# Patient Record
Sex: FEMALE | Race: WHITE | NOT HISPANIC OR LATINO | Employment: OTHER | URBAN - METROPOLITAN AREA
[De-identification: names, ages, dates, MRNs, and addresses within clinical notes are randomized per-mention and may not be internally consistent; named-entity substitution may affect disease eponyms.]

---

## 2023-10-26 PROBLEM — H93.13 BILATERAL TINNITUS: Status: ACTIVE | Noted: 2023-10-26

## 2023-10-26 PROBLEM — H90.3 BILATERAL SENSORINEURAL HEARING LOSS: Status: ACTIVE | Noted: 2023-10-26

## 2023-10-26 RX ORDER — PNV NO.95/FERROUS FUM/FOLIC AC 28MG-0.8MG
TABLET ORAL
COMMUNITY
Start: 2017-06-08

## 2023-10-26 RX ORDER — METHOTREXATE 2.5 MG/1
TABLET ORAL
COMMUNITY
Start: 2016-11-18 | End: 2024-03-01 | Stop reason: ALTCHOICE

## 2023-10-26 RX ORDER — TRIAMTERENE AND HYDROCHLOROTHIAZIDE 37.5; 25 MG/1; MG/1
CAPSULE ORAL
COMMUNITY
Start: 2011-01-13 | End: 2024-05-30

## 2023-10-26 RX ORDER — FAMOTIDINE 40 MG/1
TABLET, FILM COATED ORAL
COMMUNITY
Start: 2017-05-31 | End: 2024-04-17 | Stop reason: SDUPTHER

## 2023-10-26 RX ORDER — ESTRADIOL 0.1 MG/D
FILM, EXTENDED RELEASE TRANSDERMAL 2 TIMES WEEKLY
COMMUNITY
Start: 2016-11-18 | End: 2023-10-28 | Stop reason: WASHOUT

## 2023-10-26 RX ORDER — FOLIC ACID 1 MG/1
TABLET ORAL
COMMUNITY
Start: 2016-07-20 | End: 2023-10-28 | Stop reason: WASHOUT

## 2023-10-26 RX ORDER — AMLODIPINE BESYLATE 5 MG/1
TABLET ORAL
COMMUNITY
Start: 2016-07-08 | End: 2023-10-28 | Stop reason: WASHOUT

## 2023-10-26 RX ORDER — CELECOXIB 200 MG/1
CAPSULE ORAL
COMMUNITY
Start: 2016-07-20 | End: 2023-10-28 | Stop reason: WASHOUT

## 2023-10-26 RX ORDER — MAGNESIUM OXIDE/MAG AA CHELATE 300 MG
CAPSULE ORAL
COMMUNITY
End: 2023-10-28 | Stop reason: WASHOUT

## 2023-10-26 RX ORDER — ROPINIROLE 1 MG/1
TABLET, FILM COATED ORAL
COMMUNITY
Start: 2017-03-03

## 2023-10-27 ENCOUNTER — OFFICE VISIT (OUTPATIENT)
Dept: PRIMARY CARE | Facility: CLINIC | Age: 73
End: 2023-10-27
Payer: MEDICARE

## 2023-10-27 VITALS
HEIGHT: 61 IN | SYSTOLIC BLOOD PRESSURE: 128 MMHG | OXYGEN SATURATION: 98 % | WEIGHT: 218 LBS | BODY MASS INDEX: 41.16 KG/M2 | DIASTOLIC BLOOD PRESSURE: 70 MMHG | HEART RATE: 72 BPM | TEMPERATURE: 96.8 F | RESPIRATION RATE: 16 BRPM

## 2023-10-27 DIAGNOSIS — K21.9 GASTROESOPHAGEAL REFLUX DISEASE WITHOUT ESOPHAGITIS: ICD-10-CM

## 2023-10-27 DIAGNOSIS — M17.12 UNILATERAL PRIMARY OSTEOARTHRITIS, LEFT KNEE: Primary | ICD-10-CM

## 2023-10-27 DIAGNOSIS — I10 ESSENTIAL (PRIMARY) HYPERTENSION: ICD-10-CM

## 2023-10-27 PROCEDURE — 3074F SYST BP LT 130 MM HG: CPT | Performed by: INTERNAL MEDICINE

## 2023-10-27 PROCEDURE — 1159F MED LIST DOCD IN RCRD: CPT | Performed by: INTERNAL MEDICINE

## 2023-10-27 PROCEDURE — 99214 OFFICE O/P EST MOD 30 MIN: CPT | Performed by: INTERNAL MEDICINE

## 2023-10-27 PROCEDURE — 1160F RVW MEDS BY RX/DR IN RCRD: CPT | Performed by: INTERNAL MEDICINE

## 2023-10-27 PROCEDURE — 3078F DIAST BP <80 MM HG: CPT | Performed by: INTERNAL MEDICINE

## 2023-10-27 RX ORDER — FLUTICASONE PROPIONATE 110 UG/1
2 AEROSOL, METERED RESPIRATORY (INHALATION)
COMMUNITY

## 2023-10-27 RX ORDER — FLUTICASONE PROPIONATE 50 MCG
1 SPRAY, SUSPENSION (ML) NASAL 2 TIMES DAILY
COMMUNITY

## 2023-10-27 RX ORDER — MELOXICAM 15 MG/1
15 TABLET ORAL DAILY
COMMUNITY

## 2023-10-27 RX ORDER — UBIQUINOL 100 MG
CAPSULE ORAL
COMMUNITY

## 2023-10-27 NOTE — PROGRESS NOTES
"Subjective   Patient ID: Urvashi Alonso is a 72 y.o. female who presents for Surgery Clearance Left Knee .    HPI   Pt is here for pre-op clearance left knee.  Dr Greenwood.    Voices no other c/o at this time.   No cp/sob.   No prior hx of probs with anesthesia    Review of Systems   Musculoskeletal:         Left knee pain   During her pre-op testing, her blood pressure was uncharacteristically high.  Her blood pressure has been stable on amlodipine 2.5mg daily.    Her bp is normal during this visit.     Objective   /70 (BP Location: Right arm, Patient Position: Sitting, BP Cuff Size: Adult)   Pulse 72   Temp 36 °C (96.8 °F)   Resp 16   Ht 1.549 m (5' 1\")   Wt 98.9 kg (218 lb)   SpO2 98%   BMI 41.19 kg/m²     Physical Exam  Constitutional:       Appearance: Normal appearance. She is obese.   HENT:      Head: Normocephalic and atraumatic.      Right Ear: Tympanic membrane normal.      Left Ear: Tympanic membrane normal.      Nose: Nose normal.      Mouth/Throat:      Mouth: Mucous membranes are moist.      Pharynx: Oropharynx is clear.   Eyes:      Pupils: Pupils are equal, round, and reactive to light.   Cardiovascular:      Rate and Rhythm: Normal rate.      Heart sounds: Normal heart sounds. No murmur heard.  Pulmonary:      Effort: Pulmonary effort is normal.   Abdominal:      General: Abdomen is flat.      Palpations: Abdomen is soft.   Musculoskeletal:      Cervical back: Normal range of motion and neck supple.      Left knee: Swelling present. Decreased range of motion. Tenderness present.   Neurological:      General: No focal deficit present.      Mental Status: She is alert and oriented to person, place, and time.         Assessment/Plan     Problem List Items Addressed This Visit       Essential (primary) hypertension    Gastroesophageal reflux disease without esophagitis    Unilateral primary osteoarthritis, left knee - Primary      Dwp:  no meloxicam or asa. Tylenol is ok.     Mrs Alonso is " medically cleared for left knee replacement.

## 2023-10-28 PROBLEM — K21.9 GASTROESOPHAGEAL REFLUX DISEASE WITHOUT ESOPHAGITIS: Status: ACTIVE | Noted: 2023-10-28

## 2023-10-28 PROBLEM — G25.81 RESTLESS LEG SYNDROME: Status: ACTIVE | Noted: 2023-10-28

## 2023-10-28 PROBLEM — Z79.890 POSTMENOPAUSAL HORMONE REPLACEMENT THERAPY: Status: ACTIVE | Noted: 2023-10-28

## 2023-10-28 PROBLEM — N39.46 MIXED INCONTINENCE URGE AND STRESS: Status: ACTIVE | Noted: 2023-10-28

## 2023-10-28 PROBLEM — M48.062 SPINAL STENOSIS, LUMBAR REGION WITH NEUROGENIC CLAUDICATION: Status: ACTIVE | Noted: 2023-10-28

## 2023-10-28 PROBLEM — H81.10 BPPV (BENIGN PAROXYSMAL POSITIONAL VERTIGO), UNSPECIFIED LATERALITY: Status: ACTIVE | Noted: 2023-10-28

## 2023-10-28 PROBLEM — E55.9 VITAMIN D DEFICIENCY: Status: ACTIVE | Noted: 2023-10-28

## 2023-10-28 PROBLEM — M15.0 PRIMARY OSTEOARTHRITIS INVOLVING MULTIPLE JOINTS: Status: ACTIVE | Noted: 2023-10-28

## 2023-10-28 PROBLEM — N95.1 MENOPAUSAL SYNDROME (HOT FLASHES): Status: ACTIVE | Noted: 2023-10-28

## 2023-10-28 PROBLEM — M15.9 PRIMARY OSTEOARTHRITIS INVOLVING MULTIPLE JOINTS: Status: ACTIVE | Noted: 2023-10-28

## 2023-10-28 PROBLEM — M17.12 UNILATERAL PRIMARY OSTEOARTHRITIS, LEFT KNEE: Status: ACTIVE | Noted: 2023-10-28

## 2023-10-28 PROBLEM — I10 ESSENTIAL (PRIMARY) HYPERTENSION: Status: ACTIVE | Noted: 2023-10-28

## 2023-10-28 RX ORDER — AMLODIPINE BESYLATE 2.5 MG/1
2.5 TABLET ORAL DAILY
COMMUNITY
Start: 2023-08-18 | End: 2023-11-15

## 2023-10-28 RX ORDER — ESTRADIOL 0.05 MG/D
1 FILM, EXTENDED RELEASE TRANSDERMAL 2 TIMES WEEKLY
COMMUNITY
Start: 2023-06-07

## 2023-11-03 ENCOUNTER — CLINICAL SUPPORT (OUTPATIENT)
Dept: PRIMARY CARE | Facility: CLINIC | Age: 73
End: 2023-11-03
Payer: MEDICARE

## 2023-11-03 VITALS — SYSTOLIC BLOOD PRESSURE: 160 MMHG | DIASTOLIC BLOOD PRESSURE: 90 MMHG

## 2023-11-03 DIAGNOSIS — I10 ESSENTIAL (PRIMARY) HYPERTENSION: ICD-10-CM

## 2023-11-03 NOTE — PROGRESS NOTES
Pt has been checking her blood pressure at home:  Thur at 6:30 am 192/93, 7:30 am 164/101, 10:30 am 154/84, 2:30 pm 142/85.  This morning at 7 am 200/90.  She takes Dyazide 37.5-25 mg  q a and Amlodipine 2.5 q hs.  She has been having headaches in the morning.   She has knee surgery on Monday.

## 2023-11-15 ENCOUNTER — TELEPHONE (OUTPATIENT)
Dept: PRIMARY CARE | Facility: CLINIC | Age: 73
End: 2023-11-15
Payer: MEDICARE

## 2023-11-15 NOTE — TELEPHONE ENCOUNTER
[facility]  D[facility] IS CALLING HOME CARE  [phone]     ASKING IF YOU WILL FOLLOW I GAVE A VERBAL FOR YOU BUT IF YOU HAVE ANY QUESTIONS OR CONCERNS YOU CAN CALL HER. THANKS  SKILLED NURSING AND PT FROM HER GETTING HER SURGERY DONE.

## 2023-11-20 NOTE — TELEPHONE ENCOUNTER
[place] nurse with [facility] called. Pt has cloudy and strong odor of urine. Also concern of low sodium. Wants order to check sodium levels. She was recently hospitalized for low sodium.    Verbal order given.    [place]  [phone]

## 2023-11-22 ENCOUNTER — TELEPHONE (OUTPATIENT)
Dept: PRIMARY CARE | Facility: CLINIC | Age: 73
End: 2023-11-22
Payer: MEDICARE

## 2023-11-22 DIAGNOSIS — E87.1 HYPONATREMIA: Primary | ICD-10-CM

## 2023-11-22 DIAGNOSIS — R30.0 DYSURIA: Primary | ICD-10-CM

## 2023-11-22 RX ORDER — CIPROFLOXACIN 500 MG/1
500 TABLET ORAL 2 TIMES DAILY
Qty: 14 TABLET | Refills: 0 | Status: SHIPPED | OUTPATIENT
Start: 2023-11-22 | End: 2023-11-29

## 2023-12-06 ENCOUNTER — LAB (OUTPATIENT)
Dept: LAB | Facility: LAB | Age: 73
End: 2023-12-06
Payer: MEDICARE

## 2023-12-06 ENCOUNTER — OFFICE VISIT (OUTPATIENT)
Dept: PRIMARY CARE | Facility: CLINIC | Age: 73
End: 2023-12-06
Payer: MEDICARE

## 2023-12-06 VITALS
TEMPERATURE: 97.8 F | WEIGHT: 218 LBS | BODY MASS INDEX: 41.16 KG/M2 | HEIGHT: 61 IN | HEART RATE: 70 BPM | SYSTOLIC BLOOD PRESSURE: 116 MMHG | DIASTOLIC BLOOD PRESSURE: 76 MMHG

## 2023-12-06 DIAGNOSIS — E87.1 HYPONATREMIA: ICD-10-CM

## 2023-12-06 DIAGNOSIS — K56.7: ICD-10-CM

## 2023-12-06 DIAGNOSIS — I10 ESSENTIAL (PRIMARY) HYPERTENSION: Primary | ICD-10-CM

## 2023-12-06 DIAGNOSIS — K91.89: ICD-10-CM

## 2023-12-06 LAB
ANION GAP SERPL CALC-SCNC: 9 MMOL/L (ref 10–20)
BUN SERPL-MCNC: 20 MG/DL (ref 6–23)
CALCIUM SERPL-MCNC: 9.1 MG/DL (ref 8.6–10.3)
CHLORIDE SERPL-SCNC: 103 MMOL/L (ref 98–107)
CO2 SERPL-SCNC: 29 MMOL/L (ref 21–32)
CREAT SERPL-MCNC: 0.83 MG/DL (ref 0.5–1.05)
GFR SERPL CREATININE-BSD FRML MDRD: 75 ML/MIN/1.73M*2
GLUCOSE SERPL-MCNC: 93 MG/DL (ref 74–99)
POTASSIUM SERPL-SCNC: 4.5 MMOL/L (ref 3.5–5.3)
SODIUM SERPL-SCNC: 136 MMOL/L (ref 136–145)

## 2023-12-06 PROCEDURE — 99214 OFFICE O/P EST MOD 30 MIN: CPT | Performed by: INTERNAL MEDICINE

## 2023-12-06 PROCEDURE — 36415 COLL VENOUS BLD VENIPUNCTURE: CPT

## 2023-12-06 PROCEDURE — 3078F DIAST BP <80 MM HG: CPT | Performed by: INTERNAL MEDICINE

## 2023-12-06 PROCEDURE — 1159F MED LIST DOCD IN RCRD: CPT | Performed by: INTERNAL MEDICINE

## 2023-12-06 PROCEDURE — 1160F RVW MEDS BY RX/DR IN RCRD: CPT | Performed by: INTERNAL MEDICINE

## 2023-12-06 PROCEDURE — 1036F TOBACCO NON-USER: CPT | Performed by: INTERNAL MEDICINE

## 2023-12-06 PROCEDURE — 3074F SYST BP LT 130 MM HG: CPT | Performed by: INTERNAL MEDICINE

## 2023-12-06 PROCEDURE — 80048 BASIC METABOLIC PNL TOTAL CA: CPT

## 2023-12-06 RX ORDER — AMLODIPINE BESYLATE 2.5 MG/1
2.5 TABLET ORAL DAILY
Qty: 100 TABLET | Refills: 1 | Status: SHIPPED | OUTPATIENT
Start: 2023-12-06 | End: 2024-06-23

## 2023-12-06 RX ORDER — AMLODIPINE BESYLATE AND ATORVASTATIN CALCIUM 10; 10 MG/1; MG/1
2.5 TABLET, FILM COATED ORAL
COMMUNITY
End: 2023-12-06 | Stop reason: WASHOUT

## 2023-12-06 RX ORDER — AMLODIPINE BESYLATE 2.5 MG/1
2.5 TABLET ORAL DAILY
COMMUNITY
End: 2023-12-06 | Stop reason: SDUPTHER

## 2023-12-06 RX ORDER — ALBUTEROL SULFATE 90 UG/1
AEROSOL, METERED RESPIRATORY (INHALATION)
COMMUNITY
Start: 2023-03-11

## 2023-12-06 NOTE — PROGRESS NOTES
Primary Care Physician: Andrew Valle MD    Date of Visit: 12/06/2023     Chief Complaint:   Chief Complaint   Patient presents with    Follow-up     Surgical follow up, and discuss meds       Subjective:   Urvashi Alonso is a 73 y.o. female presents     HPI:  HPI  Pt is s/p left knee replacement.  Hospital course was complicated by post-op ileus, hyponatremia, and uti.   She is now doing rehab at home.     Prior to surgery, her bp was elevated.  Amlodipine was increased to 5mg.  However, while in the hospital,  amlodipine 2.5mg was given.   Bp remained well controlled.      Since dc from the hospital, she has been taking colace and dulcolax.  Wants to know if she can stop taking.    Hyponatremia.  Was supposed to have recheck, but visiting nurse was unable to draw.    Past Medical History:  Past Medical History:   Diagnosis Date    Personal history of other diseases of the circulatory system     H/O: HTN (hypertension)        Social History:   reports that she has never smoked. She has never been exposed to tobacco smoke. She has never used smokeless tobacco. She reports that she does not currently use alcohol. She reports that she does not use drugs.     Family History:  family history includes Cancer in her father; HEART PROBLEM in her mother.      Allergies:  No Known Allergies    Outpatient Medications:  Current Outpatient Medications   Medication Instructions    albuterol 90 mcg/actuation inhaler INHALE 1 TO 2 PUFFS BY MOUTH EVERY 4 TO 6 HOURS AS NEEDED    amLODIPine (NORVASC) 2.5 mg, oral, Daily    coQ10, ubiquinol, 100 mg capsule oral    cyanocobalamin (Vitamin B-12) 100 mcg tablet oral, w/ magnesium 1000mg    estradiol (Vivelle-DOT) 0.05 mg/24 hr patch 1 patch, transdermal, 2 times weekly    famotidine (Pepcid) 40 mg tablet oral    fluticasone (Flonase) 50 mcg/actuation nasal spray 1 spray, Each Nostril, 2 times daily, Shake gently. Before first use, prime pump. After use, clean tip and replace cap.     "fluticasone (Flovent HFA) 110 mcg/actuation inhaler 2 puffs, inhalation, 2 times daily RT, Rinse mouth with water after use to reduce aftertaste and incidence of candidiasis. Do not swallow.    meloxicam (MOBIC) 15 mg, oral, Daily    methotrexate (Trexall) 2.5 mg tablet oral    rOPINIRole (Requip) 1 mg tablet     triamterene-hydrochlorothiazid (Dyazide) 37.5-25 mg capsule     vit E-T0-wjpaycuixhdxo-vit K 2,000 unit- 2,000 mcg/mL liquid oral         ROS:   Review of Systems     Vitals:  Vitals:    12/06/23 1511   BP: 116/76   Pulse: 70   Temp: 36.6 °C (97.8 °F)   Weight: 98.9 kg (218 lb)   Height: 1.549 m (5' 1\")     Wt Readings from Last 1 Encounters:   12/06/23 98.9 kg (218 lb)     Body mass index is 41.19 kg/m².       Physical Exam:  Physical Exam  Constitutional:       Appearance: Normal appearance. She is obese.   HENT:      Head: Normocephalic and atraumatic.   Cardiovascular:      Rate and Rhythm: Normal rate and regular rhythm.      Heart sounds: Normal heart sounds.   Pulmonary:      Effort: Pulmonary effort is normal.      Breath sounds: Normal breath sounds.   Musculoskeletal:      Left knee: Swelling (mild swelling aand erythema.  incision site is intack.  no s/sx's of infection.) and erythema present. Tenderness present.   Neurological:      Mental Status: She is alert.               Assessment/Plan   Diagnoses and all orders for this visit:  Essential (primary) hypertension  -     amLODIPine (Norvasc) 2.5 mg tablet; Take 1 tablet (2.5 mg) by mouth once daily.  Hyponatremia  -     Basic metabolic panel; Future  Alteration in bowel elimination due to postoperative ileus (CMS/HCC)  Comments:  ok to stop colaceand dolcolax        Orders:  Orders Placed This Encounter   Procedures    Basic metabolic panel        Followup Appts:  No future appointments.        ____________________________________________________________  Andrew Valle MD  "

## 2023-12-11 ENCOUNTER — TELEPHONE (OUTPATIENT)
Dept: PRIMARY CARE | Facility: CLINIC | Age: 73
End: 2023-12-11
Payer: MEDICARE

## 2023-12-11 NOTE — TELEPHONE ENCOUNTER
[facility] IS REQUESTING DISCHARGE FROM HOME CARE SERVICES D/T PATIENT MEETING ALL GOALS AND DOING GREAT.

## 2024-03-01 ENCOUNTER — LAB (OUTPATIENT)
Dept: LAB | Facility: LAB | Age: 74
End: 2024-03-01
Payer: MEDICARE

## 2024-03-01 ENCOUNTER — OFFICE VISIT (OUTPATIENT)
Dept: PRIMARY CARE | Facility: CLINIC | Age: 74
End: 2024-03-01
Payer: MEDICARE

## 2024-03-01 VITALS
WEIGHT: 223 LBS | SYSTOLIC BLOOD PRESSURE: 130 MMHG | BODY MASS INDEX: 42.14 KG/M2 | OXYGEN SATURATION: 98 % | HEART RATE: 58 BPM | DIASTOLIC BLOOD PRESSURE: 84 MMHG

## 2024-03-01 DIAGNOSIS — Z13.1 SCREENING FOR DIABETES MELLITUS: ICD-10-CM

## 2024-03-01 DIAGNOSIS — R73.03 PREDIABETES: ICD-10-CM

## 2024-03-01 DIAGNOSIS — Z00.00 ENCOUNTER FOR MEDICARE ANNUAL WELLNESS EXAM: ICD-10-CM

## 2024-03-01 DIAGNOSIS — M17.0 PRIMARY OSTEOARTHRITIS OF BOTH KNEES: Primary | ICD-10-CM

## 2024-03-01 DIAGNOSIS — E55.9 VITAMIN D DEFICIENCY: ICD-10-CM

## 2024-03-01 DIAGNOSIS — R73.9 HYPERGLYCEMIA: ICD-10-CM

## 2024-03-01 DIAGNOSIS — E78.2 MIXED HYPERLIPIDEMIA: ICD-10-CM

## 2024-03-01 DIAGNOSIS — D64.9 ANEMIA, UNSPECIFIED TYPE: ICD-10-CM

## 2024-03-01 DIAGNOSIS — Z00.00 ANNUAL PHYSICAL EXAM: ICD-10-CM

## 2024-03-01 DIAGNOSIS — Z13.29 SCREENING FOR THYROID DISORDER: ICD-10-CM

## 2024-03-01 DIAGNOSIS — E87.1 HYPONATREMIA: ICD-10-CM

## 2024-03-01 DIAGNOSIS — E66.01 MORBID OBESITY DUE TO EXCESS CALORIES (MULTI): ICD-10-CM

## 2024-03-01 DIAGNOSIS — M48.062 SPINAL STENOSIS OF LUMBAR REGION WITH NEUROGENIC CLAUDICATION: ICD-10-CM

## 2024-03-01 DIAGNOSIS — N39.46 MIXED INCONTINENCE URGE AND STRESS: ICD-10-CM

## 2024-03-01 LAB
25(OH)D3 SERPL-MCNC: 34 NG/ML (ref 30–100)
ALBUMIN SERPL BCP-MCNC: 4.5 G/DL (ref 3.4–5)
ALP SERPL-CCNC: 59 U/L (ref 33–136)
ALT SERPL W P-5'-P-CCNC: 18 U/L (ref 7–45)
ANION GAP SERPL CALC-SCNC: 11 MMOL/L (ref 10–20)
APPEARANCE UR: ABNORMAL
AST SERPL W P-5'-P-CCNC: 17 U/L (ref 9–39)
BASOPHILS # BLD AUTO: 0.05 X10*3/UL (ref 0–0.1)
BASOPHILS NFR BLD AUTO: 0.5 %
BILIRUB SERPL-MCNC: 0.5 MG/DL (ref 0–1.2)
BILIRUB UR STRIP.AUTO-MCNC: NEGATIVE MG/DL
BUN SERPL-MCNC: 31 MG/DL (ref 6–23)
CALCIUM SERPL-MCNC: 10.1 MG/DL (ref 8.6–10.3)
CHLORIDE SERPL-SCNC: 100 MMOL/L (ref 98–107)
CHOLEST SERPL-MCNC: 222 MG/DL (ref 0–199)
CHOLESTEROL/HDL RATIO: 2.7
CO2 SERPL-SCNC: 32 MMOL/L (ref 21–32)
COLOR UR: YELLOW
CREAT SERPL-MCNC: 0.92 MG/DL (ref 0.5–1.05)
EGFRCR SERPLBLD CKD-EPI 2021: 66 ML/MIN/1.73M*2
EOSINOPHIL # BLD AUTO: 0.34 X10*3/UL (ref 0–0.4)
EOSINOPHIL NFR BLD AUTO: 3.1 %
ERYTHROCYTE [DISTWIDTH] IN BLOOD BY AUTOMATED COUNT: 14.4 % (ref 11.5–14.5)
GLUCOSE SERPL-MCNC: 91 MG/DL (ref 74–99)
GLUCOSE UR STRIP.AUTO-MCNC: NEGATIVE MG/DL
HCT VFR BLD AUTO: 49.1 % (ref 36–46)
HDLC SERPL-MCNC: 81.6 MG/DL
HGB BLD-MCNC: 15.3 G/DL (ref 12–16)
IMM GRANULOCYTES # BLD AUTO: 0.03 X10*3/UL (ref 0–0.5)
IMM GRANULOCYTES NFR BLD AUTO: 0.3 % (ref 0–0.9)
KETONES UR STRIP.AUTO-MCNC: NEGATIVE MG/DL
LDLC SERPL CALC-MCNC: 123 MG/DL
LEUKOCYTE ESTERASE UR QL STRIP.AUTO: NEGATIVE
LYMPHOCYTES # BLD AUTO: 2.54 X10*3/UL (ref 0.8–3)
LYMPHOCYTES NFR BLD AUTO: 23.3 %
MCH RBC QN AUTO: 29.8 PG (ref 26–34)
MCHC RBC AUTO-ENTMCNC: 31.2 G/DL (ref 32–36)
MCV RBC AUTO: 96 FL (ref 80–100)
MONOCYTES # BLD AUTO: 0.94 X10*3/UL (ref 0.05–0.8)
MONOCYTES NFR BLD AUTO: 8.6 %
NEUTROPHILS # BLD AUTO: 7.01 X10*3/UL (ref 1.6–5.5)
NEUTROPHILS NFR BLD AUTO: 64.2 %
NITRITE UR QL STRIP.AUTO: NEGATIVE
NON HDL CHOLESTEROL: 140 MG/DL (ref 0–149)
NRBC BLD-RTO: 0 /100 WBCS (ref 0–0)
PH UR STRIP.AUTO: 7 [PH]
PLATELET # BLD AUTO: 366 X10*3/UL (ref 150–450)
POTASSIUM SERPL-SCNC: 5.4 MMOL/L (ref 3.5–5.3)
PROT SERPL-MCNC: 7.5 G/DL (ref 6.4–8.2)
PROT UR STRIP.AUTO-MCNC: NEGATIVE MG/DL
RBC # BLD AUTO: 5.13 X10*6/UL (ref 4–5.2)
RBC # UR STRIP.AUTO: NEGATIVE /UL
SODIUM SERPL-SCNC: 138 MMOL/L (ref 136–145)
SP GR UR STRIP.AUTO: 1.02
TRIGL SERPL-MCNC: 85 MG/DL (ref 0–149)
TSH SERPL-ACNC: 2.27 MIU/L (ref 0.44–3.98)
UROBILINOGEN UR STRIP.AUTO-MCNC: <2 MG/DL
VLDL: 17 MG/DL (ref 0–40)
WBC # BLD AUTO: 10.9 X10*3/UL (ref 4.4–11.3)

## 2024-03-01 PROCEDURE — 80053 COMPREHEN METABOLIC PANEL: CPT

## 2024-03-01 PROCEDURE — 3079F DIAST BP 80-89 MM HG: CPT | Performed by: INTERNAL MEDICINE

## 2024-03-01 PROCEDURE — 3075F SYST BP GE 130 - 139MM HG: CPT | Performed by: INTERNAL MEDICINE

## 2024-03-01 PROCEDURE — 85025 COMPLETE CBC W/AUTO DIFF WBC: CPT

## 2024-03-01 PROCEDURE — 36415 COLL VENOUS BLD VENIPUNCTURE: CPT

## 2024-03-01 PROCEDURE — 84443 ASSAY THYROID STIM HORMONE: CPT

## 2024-03-01 PROCEDURE — 99214 OFFICE O/P EST MOD 30 MIN: CPT | Performed by: INTERNAL MEDICINE

## 2024-03-01 PROCEDURE — 80061 LIPID PANEL: CPT

## 2024-03-01 PROCEDURE — 82306 VITAMIN D 25 HYDROXY: CPT

## 2024-03-01 PROCEDURE — 1159F MED LIST DOCD IN RCRD: CPT | Performed by: INTERNAL MEDICINE

## 2024-03-01 PROCEDURE — 83036 HEMOGLOBIN GLYCOSYLATED A1C: CPT

## 2024-03-01 PROCEDURE — 3008F BODY MASS INDEX DOCD: CPT | Performed by: INTERNAL MEDICINE

## 2024-03-01 PROCEDURE — 81003 URINALYSIS AUTO W/O SCOPE: CPT

## 2024-03-01 RX ORDER — MIRABEGRON 50 MG/1
50 TABLET, EXTENDED RELEASE ORAL DAILY
Qty: 100 TABLET | Refills: 1 | Status: SHIPPED | OUTPATIENT
Start: 2024-03-01

## 2024-03-01 RX ORDER — SEMAGLUTIDE 0.25 MG/.5ML
0.25 INJECTION, SOLUTION SUBCUTANEOUS
Qty: 2 ML | Refills: 0 | Status: SHIPPED | OUTPATIENT
Start: 2024-03-01 | End: 2024-04-17

## 2024-03-01 RX ORDER — GARLIC EXTRACT 500 MG
CAPSULE ORAL
COMMUNITY
Start: 2018-08-09

## 2024-03-01 NOTE — PROGRESS NOTES
Primary Care Physician: Andrew Valle MD    Date of Visit: 03/01/2024     Chief Complaint:   Chief Complaint   Patient presents with    Follow-up     DISCUSS WEGOVY, NEED RENEWAL FOR HANDICAP CANDACE       Subjective:   Urvashi Alonso is a 73 y.o. female presents     HPI:  HPI  Wants to try injectable for wt loss.  Long dwp re pros/cons/costs/side effects, etc.    Past Medical History:  Past Medical History:   Diagnosis Date    Personal history of other diseases of the circulatory system     H/O: HTN (hypertension)        Social History:   reports that she has never smoked. She has never been exposed to tobacco smoke. She has never used smokeless tobacco. She reports that she does not currently use alcohol. She reports that she does not use drugs.     Family History:  family history includes Cancer in her father; HEART PROBLEM in her mother.      Allergies:  No Known Allergies    Outpatient Medications:  Current Outpatient Medications   Medication Instructions    albuterol 90 mcg/actuation inhaler INHALE 1 TO 2 PUFFS BY MOUTH EVERY 4 TO 6 HOURS AS NEEDED    amLODIPine (NORVASC) 2.5 mg, oral, Daily    coQ10, ubiquinol, 100 mg capsule oral    cyanocobalamin (Vitamin B-12) 100 mcg tablet oral, w/ magnesium 1000mg    estradiol (Vivelle-DOT) 0.05 mg/24 hr patch 1 patch, transdermal, 2 times weekly    famotidine (Pepcid) 40 mg tablet oral    fluticasone (Flonase) 50 mcg/actuation nasal spray 1 spray, Each Nostril, 2 times daily, Shake gently. Before first use, prime pump. After use, clean tip and replace cap.    fluticasone (Flovent HFA) 110 mcg/actuation inhaler 2 puffs, inhalation, 2 times daily RT, Rinse mouth with water after use to reduce aftertaste and incidence of candidiasis. Do not swallow.    meloxicam (MOBIC) 15 mg, oral, Daily    rOPINIRole (Requip) 1 mg tablet     triamterene-hydrochlorothiazid (Dyazide) 37.5-25 mg capsule     vit K-O7-cdbcyvczjxhkh-vit K 2,000 unit- 2,000 mcg/mL liquid oral    vitamin B  comp and C no.3 (B Complex Plus Vitamin C) 15-10-50-5-300 mg capsule oral         ROS:   Review of Systems     Vitals:  /84   Pulse 58   Wt 101 kg (223 lb)   SpO2 98%   BMI 42.14 kg/m²   Wt Readings from Last 2 Encounters:   03/01/24 101 kg (223 lb)   12/06/23 98.9 kg (218 lb)       Physical Exam:  Physical Exam  Vitals reviewed.   Constitutional:       Appearance: Normal appearance.   HENT:      Head: Normocephalic and atraumatic.      Right Ear: Tympanic membrane and ear canal normal.      Left Ear: Tympanic membrane and ear canal normal.      Nose: Nose normal.      Mouth/Throat:      Mouth: Mucous membranes are moist.      Pharynx: Oropharynx is clear.   Eyes:      Conjunctiva/sclera: Conjunctivae normal.      Pupils: Pupils are equal, round, and reactive to light.   Cardiovascular:      Rate and Rhythm: Normal rate and regular rhythm.      Heart sounds: Normal heart sounds, S1 normal and S2 normal. No murmur heard.  Pulmonary:      Effort: Pulmonary effort is normal.      Breath sounds: Normal breath sounds. No wheezing, rhonchi or rales.   Abdominal:      General: Bowel sounds are normal.      Palpations: Abdomen is soft.   Musculoskeletal:         General: Normal range of motion.      Cervical back: Neck supple.   Skin:     General: Skin is warm and dry.   Neurological:      Mental Status: She is alert and oriented to person, place, and time.               Assessment/Plan   Diagnoses and all orders for this visit:  Primary osteoarthritis of both knees  -     Disability [facility]  Spinal stenosis of lumbar region with neurogenic claudication  -     [facility]  Annual physical exam  -     CBC and Auto Differential; Future  -     Comprehensive Metabolic Panel; Future  -     Lipid Panel; Future  -     Urinalysis with Reflex Microscopic  -     Vitamin D 25-Hydroxy,Total (for eval of Vitamin D levels); Future  -     TSH with reflex to Free T4 if abnormal; Future  -     Hemoglobin A1C;  Future  Anemia, unspecified type  -     CBC and Auto Differential; Future  Encounter for Medicare annual wellness exam  -     CBC and Auto Differential; Future  -     Comprehensive Metabolic Panel; Future  -     Lipid Panel; Future  -     Urinalysis with Reflex Microscopic  -     Vitamin D 25-Hydroxy,Total (for eval of Vitamin D levels); Future  -     TSH with reflex to Free T4 if abnormal; Future  -     Hemoglobin A1C; Future  Mixed hyperlipidemia  -     Lipid Panel; Future  Vitamin D deficiency  -     Vitamin D 25-Hydroxy,Total (for eval of Vitamin D levels); Future  Screening for thyroid disorder  -     TSH with reflex to Free T4 if abnormal; Future  Screening for diabetes mellitus  -     Hemoglobin A1C; Future  Hyperglycemia  -     Hemoglobin A1C; Future  Morbid obesity due to excess calories (Multi)  -     semaglutide, weight loss, (Wegovy) 0.25 mg/0.5 mL pen injector; Inject 0.25 mg under the skin 1 (one) time per week for 4 doses.  BMI 40.0-44.9, adult (Multi)  -     semaglutide, weight loss, (Wegovy) 0.25 mg/0.5 mL pen injector; Inject 0.25 mg under the skin 1 (one) time per week for 4 doses.  Mixed incontinence urge and stress  -     mirabegron (Myrbetriq) 50 mg tablet extended release 24 hr 24 hr tablet; Take 1 tablet (50 mg) by mouth once daily.        Orders:  No orders of the defined types were placed in this encounter.       Followup Appts:  No future appointments.        ____________________________________________________________  Andrew Valle MD

## 2024-03-02 LAB
EST. AVERAGE GLUCOSE BLD GHB EST-MCNC: 126 MG/DL
HBA1C MFR BLD: 6 %

## 2024-03-04 ENCOUNTER — TELEPHONE (OUTPATIENT)
Dept: PRIMARY CARE | Facility: CLINIC | Age: 74
End: 2024-03-04
Payer: MEDICARE

## 2024-03-11 ENCOUNTER — TELEPHONE (OUTPATIENT)
Dept: PRIMARY CARE | Facility: CLINIC | Age: 74
End: 2024-03-11
Payer: MEDICARE

## 2024-03-11 NOTE — TELEPHONE ENCOUNTER
PATIENT CALLED RX LINE STATING SHE  WOULD LIKE A PHONE CALL IN REGARDS TO PRIOR AUTH FOR THE WEGOVY.

## 2024-04-14 PROBLEM — R73.03 PREDIABETES: Status: ACTIVE | Noted: 2024-04-14

## 2024-04-14 PROBLEM — M17.0 PRIMARY OSTEOARTHRITIS OF BOTH KNEES: Status: ACTIVE | Noted: 2024-04-14

## 2024-04-14 PROBLEM — E66.01 MORBID OBESITY DUE TO EXCESS CALORIES (MULTI): Status: ACTIVE | Noted: 2024-04-14

## 2024-04-14 PROBLEM — E78.2 MIXED HYPERLIPIDEMIA: Status: ACTIVE | Noted: 2024-04-14

## 2024-04-14 PROBLEM — M48.062 SPINAL STENOSIS OF LUMBAR REGION WITH NEUROGENIC CLAUDICATION: Status: ACTIVE | Noted: 2024-04-14

## 2024-04-17 ENCOUNTER — OFFICE VISIT (OUTPATIENT)
Dept: PRIMARY CARE | Facility: CLINIC | Age: 74
End: 2024-04-17
Payer: MEDICARE

## 2024-04-17 VITALS
SYSTOLIC BLOOD PRESSURE: 130 MMHG | BODY MASS INDEX: 41.54 KG/M2 | HEART RATE: 62 BPM | OXYGEN SATURATION: 95 % | WEIGHT: 220 LBS | TEMPERATURE: 97.6 F | HEIGHT: 61 IN | DIASTOLIC BLOOD PRESSURE: 82 MMHG

## 2024-04-17 DIAGNOSIS — E66.01 MORBID OBESITY DUE TO EXCESS CALORIES (MULTI): Primary | ICD-10-CM

## 2024-04-17 DIAGNOSIS — R73.03 PREDIABETES: ICD-10-CM

## 2024-04-17 DIAGNOSIS — K21.9 GASTROESOPHAGEAL REFLUX DISEASE WITHOUT ESOPHAGITIS: ICD-10-CM

## 2024-04-17 PROCEDURE — 99213 OFFICE O/P EST LOW 20 MIN: CPT | Performed by: INTERNAL MEDICINE

## 2024-04-17 PROCEDURE — 3075F SYST BP GE 130 - 139MM HG: CPT | Performed by: INTERNAL MEDICINE

## 2024-04-17 PROCEDURE — 1159F MED LIST DOCD IN RCRD: CPT | Performed by: INTERNAL MEDICINE

## 2024-04-17 PROCEDURE — 3008F BODY MASS INDEX DOCD: CPT | Performed by: INTERNAL MEDICINE

## 2024-04-17 PROCEDURE — 3079F DIAST BP 80-89 MM HG: CPT | Performed by: INTERNAL MEDICINE

## 2024-04-17 RX ORDER — SEMAGLUTIDE 1 MG/.5ML
1 INJECTION, SOLUTION SUBCUTANEOUS
Qty: 2 ML | Refills: 0 | Status: SHIPPED | OUTPATIENT
Start: 2024-04-21 | End: 2024-04-17 | Stop reason: DRUGHIGH

## 2024-04-17 RX ORDER — FAMOTIDINE 40 MG/1
40 TABLET, FILM COATED ORAL DAILY
Qty: 100 TABLET | Refills: 1 | Status: SHIPPED | OUTPATIENT
Start: 2024-04-17

## 2024-04-17 RX ORDER — SEMAGLUTIDE 0.5 MG/.5ML
0.5 INJECTION, SOLUTION SUBCUTANEOUS WEEKLY
Qty: 2 ML | Refills: 0 | Status: SHIPPED | OUTPATIENT
Start: 2024-04-17 | End: 2024-05-17

## 2024-04-17 RX ORDER — SEMAGLUTIDE 1 MG/.5ML
1 INJECTION, SOLUTION SUBCUTANEOUS
Qty: 2 ML | Refills: 0 | Status: SHIPPED | OUTPATIENT
Start: 2024-05-17 | End: 2024-05-21 | Stop reason: SDUPTHER

## 2024-04-17 NOTE — PROGRESS NOTES
Primary Care Physician: Andrew Valle MD    Date of Visit: 04/17/2024     Chief Complaint:   Chief Complaint   Patient presents with    Follow-up    Med Refill       Subjective:   Urvashi Alonso is a 73 y.o. female presents     HPI:  HPI  Pt has started wegovy to aid in wt loss.    States that she has tolerated the medication without side effects.       Past Medical History:  Past Medical History:   Diagnosis Date    Personal history of other diseases of the circulatory system     H/O: HTN (hypertension)        Social History:   reports that she has never smoked. She has never been exposed to tobacco smoke. She has never used smokeless tobacco. She reports that she does not currently use alcohol. She reports that she does not use drugs.     Family History:  family history includes Cancer in her father; HEART PROBLEM in her mother.      Allergies:  No Known Allergies    Outpatient Medications:  Current Outpatient Medications   Medication Instructions    albuterol 90 mcg/actuation inhaler INHALE 1 TO 2 PUFFS BY MOUTH EVERY 4 TO 6 HOURS AS NEEDED    amLODIPine (NORVASC) 2.5 mg, oral, Daily    coQ10, ubiquinol, 100 mg capsule oral    cyanocobalamin (Vitamin B-12) 100 mcg tablet oral, w/ magnesium 1000mg    estradiol (Vivelle-DOT) 0.05 mg/24 hr patch 1 patch, transdermal, 2 times weekly    famotidine (Pepcid) 40 mg tablet oral    fluticasone (Flonase) 50 mcg/actuation nasal spray 1 spray, Each Nostril, 2 times daily, Shake gently. Before first use, prime pump. After use, clean tip and replace cap.    fluticasone (Flovent HFA) 110 mcg/actuation inhaler 2 puffs, inhalation, 2 times daily RT, Rinse mouth with water after use to reduce aftertaste and incidence of candidiasis. Do not swallow.    meloxicam (MOBIC) 15 mg, oral, Daily    mirabegron (MYRBETRIQ) 50 mg, oral, Daily    rOPINIRole (Requip) 1 mg tablet     triamterene-hydrochlorothiazid (Dyazide) 37.5-25 mg capsule     vit C-M7-myntlthfcwmnv-vit K 2,000 unit- 2,000  "mcg/mL liquid oral    vitamin B comp and C no.3 (B Complex Plus Vitamin C) 15-10-50-5-300 mg capsule oral    Wegovy 0.25 mg, subcutaneous, Once Weekly         ROS:   Review of Systems     Vitals:  /82 (BP Location: Left arm, Patient Position: Sitting, BP Cuff Size: Adult)   Pulse 62   Temp 36.4 °C (97.6 °F) (Temporal)   Ht 1.549 m (5' 1\")   Wt 99.8 kg (220 lb)   SpO2 95%   BMI 41.57 kg/m²   Wt Readings from Last 2 Encounters:   04/17/24 99.8 kg (220 lb)   03/01/24 101 kg (223 lb)       Physical Exam:  Physical Exam  Vitals reviewed.   Constitutional:       Appearance: Normal appearance.   HENT:      Head: Normocephalic and atraumatic.   Cardiovascular:      Rate and Rhythm: Normal rate and regular rhythm.      Heart sounds: Normal heart sounds, S1 normal and S2 normal. No murmur heard.  Pulmonary:      Effort: Pulmonary effort is normal.      Breath sounds: Normal breath sounds. No wheezing, rhonchi or rales.   Neurological:      Mental Status: She is alert and oriented to person, place, and time.               Assessment/Plan   Diagnoses and all orders for this visit:  Morbid obesity due to excess calories (Multi)  -     semaglutide, weight loss, (Wegovy) 0.5 mg/0.5 mL pen injector; Inject 0.5 mg under the skin 1 (one) time per week in the early morning..  -     semaglutide, weight loss, (Wegovy) 1 mg/0.5 mL pen injector; Inject 1 mg under the skin 1 (one) time per week. Do not start before May 17, 2024.  BMI 40.0-44.9, adult (Multi)  -     semaglutide, weight loss, (Wegovy) 0.5 mg/0.5 mL pen injector; Inject 0.5 mg under the skin 1 (one) time per week in the early morning..  -     semaglutide, weight loss, (Wegovy) 1 mg/0.5 mL pen injector; Inject 1 mg under the skin 1 (one) time per week. Do not start before May 17, 2024.  Prediabetes  -     semaglutide, weight loss, (Wegovy) 0.5 mg/0.5 mL pen injector; Inject 0.5 mg under the skin 1 (one) time per week in the early morning..  -     semaglutide, " weight loss, (Wegovy) 1 mg/0.5 mL pen injector; Inject 1 mg under the skin 1 (one) time per week. Do not start before May 17, 2024.  Gastroesophageal reflux disease without esophagitis  -     famotidine (Pepcid) 40 mg tablet; Take 1 tablet (40 mg) by mouth once daily.        Orders:  No orders of the defined types were placed in this encounter.       Followup Appts:  No future appointments.        ____________________________________________________________  Andrew Valle MD

## 2024-05-18 DIAGNOSIS — E66.01 MORBID OBESITY DUE TO EXCESS CALORIES (MULTI): ICD-10-CM

## 2024-05-18 DIAGNOSIS — R73.03 PREDIABETES: ICD-10-CM

## 2024-05-20 NOTE — TELEPHONE ENCOUNTER
IT WAS SENT ON 05/17/2024 TO [facility] BUT THEY DON'T HAVE THE MEDICATIONS SO PATIENT IS ASKING FOR IT TO BE SENT TO [facility]

## 2024-05-21 RX ORDER — SEMAGLUTIDE 0.5 MG/.5ML
INJECTION, SOLUTION SUBCUTANEOUS
Qty: 2 ML | Refills: 0 | OUTPATIENT
Start: 2024-05-21

## 2024-05-21 RX ORDER — SEMAGLUTIDE 1 MG/.5ML
1 INJECTION, SOLUTION SUBCUTANEOUS
Qty: 2 ML | Refills: 0 | Status: SHIPPED | OUTPATIENT
Start: 2024-05-26 | End: 2024-06-25

## 2024-05-26 DIAGNOSIS — I10 ESSENTIAL (PRIMARY) HYPERTENSION: Primary | ICD-10-CM

## 2024-05-30 RX ORDER — TRIAMTERENE AND HYDROCHLOROTHIAZIDE 37.5; 25 MG/1; MG/1
1 CAPSULE ORAL DAILY
Qty: 90 CAPSULE | Refills: 0 | Status: SHIPPED | OUTPATIENT
Start: 2024-05-30

## 2024-06-14 ENCOUNTER — TELEPHONE (OUTPATIENT)
Dept: PRIMARY CARE | Facility: CLINIC | Age: 74
End: 2024-06-14
Payer: MEDICARE

## 2024-06-14 DIAGNOSIS — R73.03 PREDIABETES: ICD-10-CM

## 2024-06-14 DIAGNOSIS — E66.01 MORBID OBESITY DUE TO EXCESS CALORIES (MULTI): ICD-10-CM

## 2024-06-17 NOTE — TELEPHONE ENCOUNTER
PATIENT CALLED RX LINE STATES SHE'S ON HER LAST INJECTION TODAY FOR 1MG  WANTS TO GET THE NEXT STEP UP FOR THE INJECTION  [facility]

## 2024-06-18 NOTE — TELEPHONE ENCOUNTER
Pt had back surgery 2 weeks ago, can barely get around. Says she's not going to be able to come in for at least another 10 days. Asking if you will refill once more until she can move around more and come in.

## 2024-06-21 NOTE — TELEPHONE ENCOUNTER
PATIENT CALLED RX LINE AGAIN  ASKING FOR RESPONSE BACK FROM YOU IN REGARDS TO MESSAGE FROM 6/18/24

## 2024-06-24 ENCOUNTER — TELEPHONE (OUTPATIENT)
Dept: PRIMARY CARE | Facility: CLINIC | Age: 74
End: 2024-06-24

## 2024-06-24 ENCOUNTER — LAB (OUTPATIENT)
Dept: LAB | Facility: LAB | Age: 74
End: 2024-06-24
Payer: MEDICARE

## 2024-06-24 DIAGNOSIS — Z79.899 ENCOUNTER FOR LONG-TERM (CURRENT) USE OF MEDICATIONS: Primary | ICD-10-CM

## 2024-06-24 DIAGNOSIS — Z79.899 ENCOUNTER FOR LONG-TERM (CURRENT) USE OF MEDICATIONS: ICD-10-CM

## 2024-06-24 LAB
ANION GAP SERPL CALC-SCNC: 10 MMOL/L (ref 10–20)
BUN SERPL-MCNC: 15 MG/DL (ref 6–23)
CALCIUM SERPL-MCNC: 9.7 MG/DL (ref 8.6–10.3)
CHLORIDE SERPL-SCNC: 99 MMOL/L (ref 98–107)
CO2 SERPL-SCNC: 32 MMOL/L (ref 21–32)
CREAT SERPL-MCNC: 0.83 MG/DL (ref 0.5–1.05)
EGFRCR SERPLBLD CKD-EPI 2021: 75 ML/MIN/1.73M*2
EST. AVERAGE GLUCOSE BLD GHB EST-MCNC: 108 MG/DL
GLUCOSE SERPL-MCNC: 93 MG/DL (ref 74–99)
HBA1C MFR BLD: 5.4 %
POTASSIUM SERPL-SCNC: 5.2 MMOL/L (ref 3.5–5.3)
SODIUM SERPL-SCNC: 136 MMOL/L (ref 136–145)

## 2024-06-24 PROCEDURE — 80048 BASIC METABOLIC PNL TOTAL CA: CPT

## 2024-06-24 PROCEDURE — 83036 HEMOGLOBIN GLYCOSYLATED A1C: CPT

## 2024-06-24 PROCEDURE — 36415 COLL VENOUS BLD VENIPUNCTURE: CPT

## 2024-06-24 RX ORDER — SEMAGLUTIDE 1 MG/.5ML
1 INJECTION, SOLUTION SUBCUTANEOUS
Qty: 2 ML | Refills: 0 | Status: SHIPPED | OUTPATIENT
Start: 2024-06-30 | End: 2024-07-30

## 2024-06-24 NOTE — TELEPHONE ENCOUNTER
PATIENT GETTING BLOOD WORK DONE TODAY, IS NEEDING WEGOVY RF- IS GETTING THE BLOOD WORK ENOUGH OR DO YOU WANT HER TO COME IN FOR OV TOO?

## 2024-06-27 NOTE — TELEPHONE ENCOUNTER
Pt sent a message about having a 5.4 A1C now and is calling again to get the next refill on her Wegovy. She has been out for over a week.    [facility] [address]

## 2024-07-15 DIAGNOSIS — R73.03 PREDIABETES: ICD-10-CM

## 2024-07-15 DIAGNOSIS — E66.01 MORBID OBESITY DUE TO EXCESS CALORIES (MULTI): ICD-10-CM

## 2024-07-15 NOTE — TELEPHONE ENCOUNTER
PATIENT IS NEEDING  semaglutide, weight loss, (Wegovy) HAS TWO MORE INJECTIONS- PER PATIENT THE RX GOT MIXED UP WAS SUPPOSED TO HAVE A DIFFERENT DOSE CALLED IN, 1MG WAS CALLED IN,

## 2024-07-25 RX ORDER — SEMAGLUTIDE 1 MG/.5ML
1 INJECTION, SOLUTION SUBCUTANEOUS
Qty: 2 ML | Refills: 0 | OUTPATIENT
Start: 2024-07-28 | End: 2024-08-27

## 2024-08-05 ENCOUNTER — APPOINTMENT (OUTPATIENT)
Dept: PRIMARY CARE | Facility: CLINIC | Age: 74
End: 2024-08-05
Payer: MEDICARE

## 2024-08-05 VITALS
HEIGHT: 61 IN | HEART RATE: 103 BPM | SYSTOLIC BLOOD PRESSURE: 132 MMHG | DIASTOLIC BLOOD PRESSURE: 78 MMHG | RESPIRATION RATE: 18 BRPM | WEIGHT: 212.6 LBS | OXYGEN SATURATION: 91 % | TEMPERATURE: 97.3 F | BODY MASS INDEX: 40.14 KG/M2

## 2024-08-05 DIAGNOSIS — R73.03 PREDIABETES: Primary | ICD-10-CM

## 2024-08-05 PROCEDURE — 3075F SYST BP GE 130 - 139MM HG: CPT | Performed by: INTERNAL MEDICINE

## 2024-08-05 PROCEDURE — 1159F MED LIST DOCD IN RCRD: CPT | Performed by: INTERNAL MEDICINE

## 2024-08-05 PROCEDURE — 99213 OFFICE O/P EST LOW 20 MIN: CPT | Performed by: INTERNAL MEDICINE

## 2024-08-05 PROCEDURE — 3078F DIAST BP <80 MM HG: CPT | Performed by: INTERNAL MEDICINE

## 2024-08-05 PROCEDURE — 3008F BODY MASS INDEX DOCD: CPT | Performed by: INTERNAL MEDICINE

## 2024-08-05 RX ORDER — SEMAGLUTIDE 1.7 MG/.75ML
1.7 INJECTION, SOLUTION SUBCUTANEOUS
Qty: 3 ML | Refills: 0 | Status: SHIPPED | OUTPATIENT
Start: 2024-08-05 | End: 2024-09-02

## 2024-08-05 RX ORDER — SEMAGLUTIDE 2.4 MG/.75ML
2.4 INJECTION, SOLUTION SUBCUTANEOUS
Qty: 3 ML | Refills: 0 | Status: SHIPPED | OUTPATIENT
Start: 2024-09-02 | End: 2024-09-30

## 2024-08-05 NOTE — PROGRESS NOTES
Primary Care Physician: Andrew Valle MD    Date of Visit: 08/05/2024     Chief Complaint:   Chief Complaint   Patient presents with    Med Refill       Subjective:   Urvashi Alonso is a 73 y.o. female presents     HPI:  HPI  S/p Fusion L4-5  has done well  prediabetes  Morbid obesity.    Started wegovy.  She has tolerated so far.  Feels that it is helping.    Feels appetite suppression.   Will start exercise when allowed.     Past Medical History:  Past Medical History:   Diagnosis Date    Personal history of other diseases of the circulatory system     H/O: HTN (hypertension)        Social History:   reports that she has never smoked. She has never been exposed to tobacco smoke. She has never used smokeless tobacco. She reports that she does not currently use alcohol. She reports that she does not use drugs.     Family History:  family history includes Cancer in her father; HEART PROBLEM in her mother.      Allergies:  No Known Allergies    Outpatient Medications:  Current Outpatient Medications   Medication Instructions    albuterol 90 mcg/actuation inhaler INHALE 1 TO 2 PUFFS BY MOUTH EVERY 4 TO 6 HOURS AS NEEDED    amLODIPine (NORVASC) 2.5 mg, oral, Daily    coQ10, ubiquinol, 100 mg capsule oral    cyanocobalamin (Vitamin B-12) 100 mcg tablet oral, w/ magnesium 1000mg    estradiol (Vivelle-DOT) 0.05 mg/24 hr patch 1 patch, transdermal, 2 times weekly    famotidine (PEPCID) 40 mg, oral, Daily    fluticasone (Flonase) 50 mcg/actuation nasal spray 1 spray, Each Nostril, 2 times daily, Shake gently. Before first use, prime pump. After use, clean tip and replace cap.    fluticasone (Flovent HFA) 110 mcg/actuation inhaler 2 puffs, inhalation, 2 times daily RT, Rinse mouth with water after use to reduce aftertaste and incidence of candidiasis. Do not swallow.    meloxicam (MOBIC) 15 mg, oral, Daily    mirabegron (MYRBETRIQ) 50 mg, oral, Daily    rOPINIRole (Requip) 1 mg tablet     triamterene-hydrochlorothiazid  "(Dyazide) 37.5-25 mg capsule 1 capsule, oral, Daily    vit M-D8-xzrirupvolmro-vit K 2,000 unit- 2,000 mcg/mL liquid oral    vitamin B comp and C no.3 (B Complex Plus Vitamin C) 15-10-50-5-300 mg capsule oral    Wegovy 0.25 mg, subcutaneous, Once Weekly    Wegovy 0.5 mg, subcutaneous, Weekly    Wegovy 1 mg, subcutaneous, Once Weekly         ROS:   Review of Systems     Vitals:  /78 (BP Location: Left arm, Patient Position: Sitting, BP Cuff Size: Adult)   Pulse 103   Temp 36.3 °C (97.3 °F) (Temporal)   Resp 18   Ht 1.549 m (5' 1\")   Wt 96.4 kg (212 lb 9.6 oz)   SpO2 91%   BMI 40.17 kg/m²   Wt Readings from Last 2 Encounters:   08/05/24 96.4 kg (212 lb 9.6 oz)   04/17/24 99.8 kg (220 lb)       Physical Exam:  Physical Exam  Vitals reviewed.   Constitutional:       Appearance: Normal appearance.   HENT:      Head: Normocephalic and atraumatic.   Cardiovascular:      Rate and Rhythm: Normal rate and regular rhythm.      Heart sounds: Normal heart sounds, S1 normal and S2 normal. No murmur heard.  Pulmonary:      Effort: Pulmonary effort is normal.      Breath sounds: Normal breath sounds. No wheezing, rhonchi or rales.   Abdominal:      General: Bowel sounds are normal.      Palpations: Abdomen is soft.   Neurological:      Mental Status: She is alert and oriented to person, place, and time.               Assessment/Plan   Diagnoses and all orders for this visit:  Prediabetes  -     semaglutide, weight loss, (Wegovy) 1.7 mg/0.75 mL pen injector; Inject 1.7 mg under the skin 1 (one) time per week for 4 doses.  -     semaglutide, weight loss, (Wegovy) 2.4 mg/0.75 mL pen injector; Inject 2.4 mg under the skin 1 (one) time per week for 4 doses. Do not fill before September 2, 2024.        Orders:  No orders of the defined types were placed in this encounter.       Followup Appts:  No future appointments.        ____________________________________________________________  Andrew Valle MD    "

## 2024-08-10 DIAGNOSIS — N39.46 MIXED INCONTINENCE URGE AND STRESS: ICD-10-CM

## 2024-08-10 DIAGNOSIS — I10 ESSENTIAL (PRIMARY) HYPERTENSION: ICD-10-CM

## 2024-08-10 DIAGNOSIS — K21.9 GASTROESOPHAGEAL REFLUX DISEASE WITHOUT ESOPHAGITIS: ICD-10-CM

## 2024-08-13 RX ORDER — FAMOTIDINE 40 MG/1
40 TABLET, FILM COATED ORAL DAILY
Qty: 100 TABLET | Refills: 1 | Status: SHIPPED | OUTPATIENT
Start: 2024-08-13

## 2024-08-13 RX ORDER — MIRABEGRON 50 MG/1
50 TABLET, EXTENDED RELEASE ORAL DAILY
Qty: 100 TABLET | Refills: 1 | Status: SHIPPED | OUTPATIENT
Start: 2024-08-13

## 2024-08-13 RX ORDER — TRIAMTERENE AND HYDROCHLOROTHIAZIDE 37.5; 25 MG/1; MG/1
1 CAPSULE ORAL DAILY
Qty: 100 CAPSULE | Refills: 1 | Status: SHIPPED | OUTPATIENT
Start: 2024-08-13

## 2024-08-13 RX ORDER — AMLODIPINE BESYLATE 2.5 MG/1
2.5 TABLET ORAL DAILY
Qty: 100 TABLET | Refills: 1 | Status: SHIPPED | OUTPATIENT
Start: 2024-08-13 | End: 2025-03-01

## 2024-08-19 DIAGNOSIS — G25.81 RESTLESS LEG SYNDROME: Primary | ICD-10-CM

## 2024-08-22 RX ORDER — ROPINIROLE 1 MG/1
1 TABLET, FILM COATED ORAL 3 TIMES DAILY PRN
Qty: 300 TABLET | Refills: 0 | Status: SHIPPED | OUTPATIENT
Start: 2024-08-22

## 2024-08-26 ENCOUNTER — APPOINTMENT (OUTPATIENT)
Dept: PRIMARY CARE | Facility: CLINIC | Age: 74
End: 2024-08-26
Payer: MEDICARE

## 2024-08-26 DIAGNOSIS — E66.01 MORBID OBESITY DUE TO EXCESS CALORIES (MULTI): Primary | ICD-10-CM

## 2024-08-26 PROCEDURE — 3008F BODY MASS INDEX DOCD: CPT | Performed by: INTERNAL MEDICINE

## 2024-08-26 PROCEDURE — 99213 OFFICE O/P EST LOW 20 MIN: CPT | Performed by: INTERNAL MEDICINE

## 2024-08-26 NOTE — PROGRESS NOTES
"Primary Care Physician: Andrew Valle MD    Date of Visit: 08/26/2024     Virtual or Telephone Consent    An interactive audio and video telecommunication system which permits real time communications between the patient (at the originating site) and provider (at the distant site) was utilized to provide this telehealth service.   Verbal consent was requested and obtained from Urvashi Alonso on this date, 8/26/24 for a telehealth visit.      Chief Complaint:   No chief complaint on file.    HPI:  HPI    Subjective:   Urvashi Alonso is a 73 y.o. female presents obesity f/up  Pt has been on wegovy to aid in wt loss.    Due to med shortage, she has only taken 2 doses of the 1.7mg dosing  Wt is 208.6  Side effect--notes some fatigue/tiredness days 2-4 after dosing  No nausea/vomiting.     Allergies:  No Known Allergies    Outpatient Medications:  Current Outpatient Medications   Medication Instructions    amLODIPine (NORVASC) 2.5 mg, oral, Daily    coQ10, ubiquinol, 100 mg capsule oral    cyanocobalamin (Vitamin B-12) 100 mcg tablet oral, w/ magnesium 1000mg    estradiol (Vivelle-DOT) 0.05 mg/24 hr patch 1 patch, transdermal, 2 times weekly    famotidine (PEPCID) 40 mg, oral, Daily    fluticasone (Flonase) 50 mcg/actuation nasal spray 1 spray, Each Nostril, 2 times daily, Shake gently. Before first use, prime pump. After use, clean tip and replace cap.    meloxicam (MOBIC) 15 mg, oral, Daily    mirabegron (MYRBETRIQ) 50 mg, oral, Daily    rOPINIRole (REQUIP) 1 mg, oral, 3 times daily PRN    triamterene-hydrochlorothiazid (Dyazide) 37.5-25 mg capsule 1 capsule, oral, Daily    vit M-N7-tfukbboahybnr-vit K 2,000 unit- 2,000 mcg/mL liquid oral    vitamin B comp and C no.3 (B Complex Plus Vitamin C) 15-10-50-5-300 mg capsule oral    Wegovy 1.7 mg, subcutaneous, Once Weekly    [START ON 9/2/2024] Wegovy 2.4 mg, subcutaneous, Once Weekly       ROS:   Review of Systems     Vitals:  Ht 1.549 m (5' 1\")   Wt 94.6 kg (208 lb " 9.6 oz)   BMI 39.41 kg/m²   Wt Readings from Last 3 Encounters:   08/26/24 94.6 kg (208 lb 9.6 oz)   08/05/24 96.4 kg (212 lb 9.6 oz)   04/17/24 99.8 kg (220 lb)     BP Readings from Last 3 Encounters:   08/05/24 132/78   04/17/24 130/82   03/01/24 130/84        Physical Exam:  Physical Exam   No exam.   Telehealth    Assessment/Plan   Diagnoses and all orders for this visit:  Morbid obesity due to excess calories (Multi)  Call with wt in the next 2 weeks  She is to schedule in office f/up during the 3rd week of taking the 2.4mg dose of wegovy.  Check hgba1c and bs at that time.     Orders:  No orders of the defined types were placed in this encounter.       Followup Appts:  No future appointments.        ____________________________________________________________  Andrew Valle MD

## 2024-08-27 VITALS — HEIGHT: 61 IN | WEIGHT: 208.6 LBS | BODY MASS INDEX: 39.38 KG/M2

## 2024-10-01 ENCOUNTER — APPOINTMENT (OUTPATIENT)
Dept: PRIMARY CARE | Facility: CLINIC | Age: 74
End: 2024-10-01
Payer: MEDICARE

## 2024-10-01 VITALS
DIASTOLIC BLOOD PRESSURE: 67 MMHG | BODY MASS INDEX: 39.08 KG/M2 | SYSTOLIC BLOOD PRESSURE: 122 MMHG | HEART RATE: 81 BPM | WEIGHT: 207 LBS | HEIGHT: 61 IN | OXYGEN SATURATION: 96 %

## 2024-10-01 DIAGNOSIS — E66.01 MORBID OBESITY DUE TO EXCESS CALORIES (MULTI): Primary | ICD-10-CM

## 2024-10-01 DIAGNOSIS — Z79.899 ENCOUNTER FOR LONG-TERM (CURRENT) USE OF MEDICATIONS: ICD-10-CM

## 2024-10-01 DIAGNOSIS — R73.03 PREDIABETES: ICD-10-CM

## 2024-10-01 LAB
POC FINGERSTICK BLOOD GLUCOSE: 101 MG/DL (ref 70–100)
POC HEMOGLOBIN A1C: 5.3 % (ref 4.2–6.5)

## 2024-10-01 PROCEDURE — 1159F MED LIST DOCD IN RCRD: CPT | Performed by: INTERNAL MEDICINE

## 2024-10-01 PROCEDURE — 3074F SYST BP LT 130 MM HG: CPT | Performed by: INTERNAL MEDICINE

## 2024-10-01 PROCEDURE — 3078F DIAST BP <80 MM HG: CPT | Performed by: INTERNAL MEDICINE

## 2024-10-01 PROCEDURE — 82962 GLUCOSE BLOOD TEST: CPT | Performed by: INTERNAL MEDICINE

## 2024-10-01 PROCEDURE — G0447 BEHAVIOR COUNSEL OBESITY 15M: HCPCS | Performed by: INTERNAL MEDICINE

## 2024-10-01 PROCEDURE — 83036 HEMOGLOBIN GLYCOSYLATED A1C: CPT | Performed by: INTERNAL MEDICINE

## 2024-10-01 PROCEDURE — 3008F BODY MASS INDEX DOCD: CPT | Performed by: INTERNAL MEDICINE

## 2024-10-01 PROCEDURE — 99213 OFFICE O/P EST LOW 20 MIN: CPT | Performed by: INTERNAL MEDICINE

## 2024-10-01 RX ORDER — SEMAGLUTIDE 2.4 MG/.75ML
2.4 INJECTION, SOLUTION SUBCUTANEOUS
Qty: 9 ML | Refills: 0 | Status: SHIPPED | OUTPATIENT
Start: 2024-10-06

## 2024-10-01 NOTE — PROGRESS NOTES
"Primary Care Physician: Andrew Valle MD    Date of Visit: 10/01/2024     Chief Complaint:   Chief Complaint   Patient presents with   • Follow-up     HPI:  HPI    Subjective:   Urvashi Alonso is a 73 y.o. female presents   On wegovy for obesity  1 pound loss since increasing the dose to 2.4mg.  disappointed  Appetite seems to kick up during the middle of the week   Snacking ---ahn bar, ensure   Diet is good.    She was doing [employer] pt system at one time.  Still has the uli.  Encouraged her to incorporate this.  Exercising 3 days a week   Has a day during the week when she has increased # of bm's    Allergies:  No Known Allergies    Outpatient Medications:  Current Outpatient Medications   Medication Instructions   • amLODIPine (NORVASC) 2.5 mg, oral, Daily   • coQ10, ubiquinol, 100 mg capsule oral   • cyanocobalamin (Vitamin B-12) 100 mcg tablet oral, w/ magnesium 1000mg   • estradiol (Vivelle-DOT) 0.05 mg/24 hr patch 1 patch, transdermal, 2 times weekly   • famotidine (PEPCID) 40 mg, oral, Daily   • fluticasone (Flonase) 50 mcg/actuation nasal spray 1 spray, Each Nostril, 2 times daily, Shake gently. Before first use, prime pump. After use, clean tip and replace cap.   • meloxicam (MOBIC) 15 mg, oral, Daily   • mirabegron (MYRBETRIQ) 50 mg, oral, Daily   • rOPINIRole (REQUIP) 1 mg, oral, 3 times daily PRN   • triamterene-hydrochlorothiazid (Dyazide) 37.5-25 mg capsule 1 capsule, oral, Daily   • vit E-C7-tcieifdsuuzdb-vit K 2,000 unit- 2,000 mcg/mL liquid oral   • vitamin B comp and C no.3 (B Complex Plus Vitamin C) 15-10-50-5-300 mg capsule oral   • Wegovy 1.7 mg, subcutaneous, Once Weekly   • Wegovy 2.4 mg, subcutaneous, Once Weekly       ROS:   Review of Systems     Vitals:  /67 (BP Location: Left arm, Patient Position: Sitting, BP Cuff Size: Adult)   Pulse 81   Ht 1.549 m (5' 1\")   Wt 93.9 kg (207 lb)   SpO2 96%   BMI 39.11 kg/m²   Wt Readings from Last 3 Encounters:   10/01/24 93.9 kg " (207 lb)   08/26/24 94.6 kg (208 lb 9.6 oz)   08/05/24 96.4 kg (212 lb 9.6 oz)     BP Readings from Last 3 Encounters:   10/01/24 122/67   08/05/24 132/78   04/17/24 130/82        Physical Exam:  Physical Exam  Vitals reviewed.   Constitutional:       Appearance: Normal appearance.   HENT:      Head: Normocephalic and atraumatic.   Cardiovascular:      Rate and Rhythm: Normal rate and regular rhythm.      Heart sounds: Normal heart sounds, S1 normal and S2 normal. No murmur heard.  Pulmonary:      Effort: Pulmonary effort is normal.      Breath sounds: Normal breath sounds. No wheezing, rhonchi or rales.   Abdominal:      General: Bowel sounds are normal.      Palpations: Abdomen is soft.   Skin:     General: Skin is warm and dry.   Neurological:      General: No focal deficit present.      Mental Status: She is alert and oriented to person, place, and time.        Assessment/Plan   Diagnoses and all orders for this visit:  Morbid obesity due to excess calories (Multi)  -     POCT Fingerstick Glucose manually resulted  -     POCT glycosylated hemoglobin (Hb A1C) manually resulted  Prediabetes  Encounter for long-term (current) use of medications  -     POCT Fingerstick Glucose manually resulted  -     POCT glycosylated hemoglobin (Hb A1C) manually resulted  Now at maintenance for wegovy.    Script for 3 mos.   F/up in 3 mos.  If not much wt loss success during that time, consider changing to zepbound    Orders:  Orders Placed This Encounter   Procedures   • POCT Fingerstick Glucose manually resulted   • POCT glycosylated hemoglobin (Hb A1C) manually resulted        Followup Appts:  Future Appointments   Date Time Provider Department Center   1/2/2025  1:00 PM [id number]      3 mos     ____________________________________________________________  Andrew Valle MD

## 2024-11-09 ENCOUNTER — OFFICE VISIT (OUTPATIENT)
Dept: URGENT CARE | Facility: CLINIC | Age: 74
End: 2024-11-09
Payer: MEDICARE

## 2024-11-09 VITALS
OXYGEN SATURATION: 96 % | WEIGHT: 207 LBS | SYSTOLIC BLOOD PRESSURE: 136 MMHG | HEART RATE: 76 BPM | BODY MASS INDEX: 38.09 KG/M2 | DIASTOLIC BLOOD PRESSURE: 84 MMHG | HEIGHT: 62 IN | TEMPERATURE: 96.8 F

## 2024-11-09 DIAGNOSIS — R30.0 DYSURIA: Primary | ICD-10-CM

## 2024-11-09 DIAGNOSIS — R82.998 LEUKOCYTES IN URINE: ICD-10-CM

## 2024-11-09 PROBLEM — Z96.611 PRESENCE OF RIGHT ARTIFICIAL SHOULDER JOINT: Status: ACTIVE | Noted: 2021-12-17

## 2024-11-09 PROBLEM — R91.1 PULMONARY NODULE: Status: ACTIVE | Noted: 2024-11-09

## 2024-11-09 PROBLEM — M25.511 PAIN IN RIGHT SHOULDER: Status: ACTIVE | Noted: 2019-08-27

## 2024-11-09 PROBLEM — H26.9 CORTICAL CATARACT OF BOTH EYES: Status: ACTIVE | Noted: 2023-03-10

## 2024-11-09 PROBLEM — Z79.1 LONG TERM CURRENT USE OF NON-STEROIDAL ANTI-INFLAMMATORIES (NSAID): Status: ACTIVE | Noted: 2023-05-22

## 2024-11-09 PROBLEM — H81.10 BPPV (BENIGN PAROXYSMAL POSITIONAL VERTIGO), UNSPECIFIED LATERALITY: Status: RESOLVED | Noted: 2023-10-28 | Resolved: 2024-11-09

## 2024-11-09 PROBLEM — Z86.79 HISTORY OF HYPERTENSION: Status: ACTIVE | Noted: 2024-11-09

## 2024-11-09 PROBLEM — M10.9 GOUTY ARTHRITIS OF TOE: Status: RESOLVED | Noted: 2019-09-25 | Resolved: 2024-11-09

## 2024-11-09 PROBLEM — H52.03 HYPEROPIA OF BOTH EYES: Status: ACTIVE | Noted: 2023-03-10

## 2024-11-09 PROBLEM — M13.0 POLYARTHROPATHY: Status: ACTIVE | Noted: 2024-11-09

## 2024-11-09 PROBLEM — M46.1 INFLAMMATION OF SACROILIAC JOINT (CMS-HCC): Status: ACTIVE | Noted: 2018-05-30

## 2024-11-09 PROBLEM — Z96.649 HIP JOINT REPLACEMENT STATUS: Status: ACTIVE | Noted: 2024-11-09

## 2024-11-09 PROBLEM — H52.223 REGULAR ASTIGMATISM OF BOTH EYES: Status: ACTIVE | Noted: 2023-03-10

## 2024-11-09 PROBLEM — G89.29 NECK PAIN, CHRONIC: Status: ACTIVE | Noted: 2019-08-27

## 2024-11-09 PROBLEM — L40.50 PSORIASIS WITH ARTHROPATHY (MULTI): Status: ACTIVE | Noted: 2024-11-09

## 2024-11-09 PROBLEM — R05.3 CHRONIC COUGH: Status: ACTIVE | Noted: 2023-10-11

## 2024-11-09 PROBLEM — M25.569 KNEE PAIN: Status: ACTIVE | Noted: 2024-11-09

## 2024-11-09 PROBLEM — H52.4 PRESBYOPIA OF BOTH EYES: Status: ACTIVE | Noted: 2023-03-10

## 2024-11-09 PROBLEM — R42 DIZZINESS: Status: RESOLVED | Noted: 2024-11-09 | Resolved: 2024-11-09

## 2024-11-09 PROBLEM — M54.2 NECK PAIN, CHRONIC: Status: ACTIVE | Noted: 2019-08-27

## 2024-11-09 PROBLEM — J22 LOWER RESPIRATORY TRACT INFECTION: Status: RESOLVED | Noted: 2024-11-09 | Resolved: 2024-11-09

## 2024-11-09 PROBLEM — E74.9 DISORDER OF CARBOHYDRATE METABOLISM (MULTI): Status: ACTIVE | Noted: 2019-01-23

## 2024-11-09 PROBLEM — E87.1 HYPONATREMIA: Status: ACTIVE | Noted: 2023-11-11

## 2024-11-09 PROBLEM — H43.813 POSTERIOR VITREOUS DETACHMENT, BILATERAL: Status: ACTIVE | Noted: 2023-03-10

## 2024-11-09 PROBLEM — H25.13 CATARACT, NUCLEAR SCLEROTIC, BOTH EYES: Status: ACTIVE | Noted: 2023-03-10

## 2024-11-09 PROBLEM — M20.10 ACQUIRED HALLUX VALGUS: Status: ACTIVE | Noted: 2019-11-25

## 2024-11-09 PROBLEM — M50.30 DEGENERATION OF CERVICAL INTERVERTEBRAL DISC: Status: ACTIVE | Noted: 2019-08-27

## 2024-11-09 PROBLEM — Z96.652 PRESENCE OF LEFT ARTIFICIAL KNEE JOINT: Status: ACTIVE | Noted: 2024-01-29

## 2024-11-09 PROBLEM — M87.059: Status: ACTIVE | Noted: 2017-06-20

## 2024-11-09 PROBLEM — M25.549 HAND JOINT PAIN: Status: ACTIVE | Noted: 2024-11-09

## 2024-11-09 PROBLEM — Z12.11 ENCOUNTER FOR SCREENING FOR MALIGNANT NEOPLASM OF COLON: Status: ACTIVE | Noted: 2023-05-22

## 2024-11-09 LAB
POC APPEARANCE, URINE: CLEAR
POC BILIRUBIN, URINE: NEGATIVE
POC BLOOD, URINE: NEGATIVE
POC COLOR, URINE: ABNORMAL
POC GLUCOSE, URINE: NEGATIVE MG/DL
POC KETONES, URINE: ABNORMAL MG/DL
POC LEUKOCYTES, URINE: ABNORMAL
POC NITRITE,URINE: NEGATIVE
POC PH, URINE: 6 PH
POC PROTEIN, URINE: ABNORMAL MG/DL
POC SPECIFIC GRAVITY, URINE: 1.02
POC UROBILINOGEN, URINE: 0.2 EU/DL

## 2024-11-09 PROCEDURE — 3075F SYST BP GE 130 - 139MM HG: CPT

## 2024-11-09 PROCEDURE — 81003 URINALYSIS AUTO W/O SCOPE: CPT | Mod: CLIA WAIVED TEST

## 2024-11-09 PROCEDURE — 99213 OFFICE O/P EST LOW 20 MIN: CPT

## 2024-11-09 PROCEDURE — 3079F DIAST BP 80-89 MM HG: CPT

## 2024-11-09 PROCEDURE — 3008F BODY MASS INDEX DOCD: CPT

## 2024-11-09 PROCEDURE — 1036F TOBACCO NON-USER: CPT

## 2024-11-09 PROCEDURE — 1159F MED LIST DOCD IN RCRD: CPT

## 2024-11-09 PROCEDURE — 87086 URINE CULTURE/COLONY COUNT: CPT

## 2024-11-09 RX ORDER — OXYCODONE HYDROCHLORIDE 10 MG/1
TABLET ORAL
COMMUNITY
Start: 2024-06-04

## 2024-11-09 RX ORDER — AMOXICILLIN AND CLAVULANATE POTASSIUM 875; 125 MG/1; MG/1
TABLET, FILM COATED ORAL EVERY 12 HOURS
COMMUNITY
Start: 2023-03-11 | End: 2024-11-09 | Stop reason: ALTCHOICE

## 2024-11-09 RX ORDER — TRAMADOL HYDROCHLORIDE 50 MG/1
1 TABLET ORAL EVERY 6 HOURS PRN
COMMUNITY
Start: 2024-09-11

## 2024-11-09 RX ORDER — TIZANIDINE 2 MG/1
TABLET ORAL
COMMUNITY
Start: 2024-06-10

## 2024-11-09 RX ORDER — CEPHALEXIN 500 MG/1
CAPSULE ORAL
COMMUNITY
Start: 2024-06-13 | End: 2024-11-09 | Stop reason: ALTCHOICE

## 2024-11-09 RX ORDER — DOCUSATE SODIUM 100 MG/1
1 CAPSULE, LIQUID FILLED ORAL
COMMUNITY
Start: 2024-06-06

## 2024-11-09 RX ORDER — DIAZEPAM 5 MG/1
TABLET ORAL
COMMUNITY
Start: 2024-04-03

## 2024-11-09 RX ORDER — NITROFURANTOIN 25; 75 MG/1; MG/1
100 CAPSULE ORAL 2 TIMES DAILY
Qty: 14 CAPSULE | Refills: 0 | Status: SHIPPED | OUTPATIENT
Start: 2024-11-09 | End: 2024-11-16

## 2024-11-09 RX ORDER — ALBUTEROL SULFATE 90 UG/1
INHALANT RESPIRATORY (INHALATION)
COMMUNITY
Start: 2023-03-11

## 2024-11-09 ASSESSMENT — ENCOUNTER SYMPTOMS
SHORTNESS OF BREATH: 0
ABDOMINAL PAIN: 0
MYALGIAS: 0
MUSCULOSKELETAL NEGATIVE: 1
FACIAL SWELLING: 0
GASTROINTESTINAL NEGATIVE: 1
FATIGUE: 0
DYSURIA: 1
WEAKNESS: 0
COLOR CHANGE: 0
VOMITING: 0
HEADACHES: 0
DIZZINESS: 0
COUGH: 0
DIFFICULTY URINATING: 1
DIAPHORESIS: 0
RHINORRHEA: 0
NAUSEA: 0
FREQUENCY: 1
CONSTITUTIONAL NEGATIVE: 1
VOICE CHANGE: 0
EYE REDNESS: 0
CARDIOVASCULAR NEGATIVE: 1
HEMATURIA: 0
FLANK PAIN: 0
CHILLS: 0
ARTHRALGIAS: 0
PALPITATIONS: 0
WOUND: 0
SORE THROAT: 0
FEVER: 0
RESPIRATORY NEGATIVE: 1
NEUROLOGICAL NEGATIVE: 1
TROUBLE SWALLOWING: 0
DIARRHEA: 0

## 2024-11-09 NOTE — PROGRESS NOTES
Subjective   History  Urvashi Alonso is a 73 y.o. female who presents for Difficulty Urinating.      History provided by:  Patient and medical records  Difficulty Urinating  Pain quality:  Burning  Pain severity:  Mild  Onset quality:  Sudden  Duration:  1 day  Timing:  Constant  Progression:  Waxing and waning  Recent urinary tract infections: no    Relieved by:  Cranberry juice  Urinary symptoms: foul-smelling urine, frequent urination and hesitancy    Urinary symptoms: no discolored urine, no hematuria and no bladder incontinence    Associated symptoms: no abdominal pain, no fever, no flank pain, no nausea, no vaginal discharge and no vomiting    Risk factors: hx of pyelonephritis    Risk factors: no hx of urolithiasis, no kidney transplant and no recurrent urinary tract infections      Past Surgical History:   Procedure Laterality Date    CHOLECYSTECTOMY  06/08/2017    Cholecystectomy    HIP SURGERY  06/08/2017    Hip Surgery    HYSTERECTOMY  06/08/2017    Hysterectomy    TOTAL KNEE ARTHROPLASTY  06/08/2017    Knee Replacement    TUBAL LIGATION  06/08/2017    Tubal Ligation     Social History     Tobacco Use    Smoking status: Never     Passive exposure: Never    Smokeless tobacco: Never   Substance Use Topics    Alcohol use: Not Currently    Drug use: Never       Review of Systems   Review of Systems   Constitutional: Negative.  Negative for chills, diaphoresis, fatigue and fever.   HENT: Negative.  Negative for congestion, facial swelling, rhinorrhea, sore throat, trouble swallowing and voice change.    Eyes:  Negative for redness.   Respiratory: Negative.  Negative for cough and shortness of breath.    Cardiovascular: Negative.  Negative for chest pain and palpitations.   Gastrointestinal: Negative.  Negative for abdominal pain, diarrhea, nausea and vomiting.   Genitourinary:  Positive for difficulty urinating, dysuria, frequency and urgency. Negative for enuresis, flank pain, genital sores, hematuria,  "pelvic pain and vaginal discharge.   Musculoskeletal: Negative.  Negative for arthralgias and myalgias.   Skin: Negative.  Negative for color change, rash and wound.   Neurological: Negative.  Negative for dizziness, weakness and headaches.       Objective   Vital Signs  /84 (BP Location: Right arm, Patient Position: Sitting, BP Cuff Size: Large adult)   Pulse 76   Temp 36 °C (96.8 °F) (Oral)   Ht 1.575 m (5' 2\")   Wt 93.9 kg (207 lb)   SpO2 96%   BMI 37.86 kg/m²    All vitals have been reviewed and are stable.     Physical Exam  Physical Exam  Vitals and nursing note reviewed.   Constitutional:       General: She is awake. She is not in acute distress.     Appearance: Normal appearance. She is well-developed. She is not ill-appearing.   HENT:      Head: Normocephalic and atraumatic.      Right Ear: External ear normal.      Left Ear: External ear normal.      Nose: Nose normal.      Mouth/Throat:      Mouth: Mucous membranes are moist.   Eyes:      Extraocular Movements: Extraocular movements intact.      Conjunctiva/sclera: Conjunctivae normal.      Pupils: Pupils are equal, round, and reactive to light.   Cardiovascular:      Rate and Rhythm: Normal rate and regular rhythm.   Pulmonary:      Effort: Pulmonary effort is normal. No respiratory distress.      Breath sounds: No stridor.   Abdominal:      General: Abdomen is flat.      Palpations: Abdomen is soft.      Tenderness: There is no abdominal tenderness. There is no right CVA tenderness or left CVA tenderness.   Musculoskeletal:         General: No swelling or deformity. Normal range of motion.      Cervical back: Normal range of motion and neck supple.   Skin:     General: Skin is warm and dry.      Findings: No rash.   Neurological:      General: No focal deficit present.      Mental Status: She is alert and oriented to person, place, and time. Mental status is at baseline.      Motor: Motor function is intact.      Coordination: Coordination " is intact.   Psychiatric:         Mood and Affect: Mood and affect normal.         Speech: Speech normal.         Behavior: Behavior normal.         Diagnostic Results   Recent Results (from the past 48 hours)   POCT UA Automated manually resulted    Collection Time: 11/09/24 10:53 AM   Result Value Ref Range    POC Color, Urine Straw Straw, Yellow, Light-Yellow    POC Appearance, Urine Clear Clear    POC Glucose, Urine NEGATIVE NEGATIVE mg/dl    POC Bilirubin, Urine NEGATIVE NEGATIVE    POC Ketones, Urine TRACE (A) NEGATIVE mg/dl    POC Specific Gravity, Urine 1.025 1.005 - 1.035    POC Blood, Urine NEGATIVE NEGATIVE    POC PH, Urine 6.0 No Reference Range Established PH    POC Protein, Urine 30 (1+) NEGATIVE, 30 (1+) mg/dl    POC Urobilinogen, Urine 0.2 0.2, 1.0 EU/DL    Poc Nitrite, Urine NEGATIVE NEGATIVE    POC Leukocytes, Urine SMALL (1+) (A) NEGATIVE       Assessment/Plan   Procedures   N/A    Problem List Items Addressed This Visit    None  Visit Diagnoses       Dysuria    -  Primary    Relevant Medications    nitrofurantoin, macrocrystal-monohydrate, (Macrobid) 100 mg capsule    Other Relevant Orders    Urine Culture    POCT UA Automated manually resulted (Completed)    Leukocytes in urine        Relevant Medications    nitrofurantoin, macrocrystal-monohydrate, (Macrobid) 100 mg capsule            UC Course  Patient disposition: Home    Red flags for reporting to ER have been reviewed with the patient.    Current diagnosis, any medication changes, and all in-office lab or radiologic results have been reviewed with the patient at the time of the visit.   If symptoms do not improve or worsen, patient is to follow up with PCP or report to the emergency room.   Patient is alert and oriented x3 and non-toxic appearing. Vital signs are stable.   Patient and/or guardian has sufficient decision-making capabilities at this time and reports understanding and agreement with the treatment plan made through shared  decision-making.

## 2024-11-11 LAB — BACTERIA UR CULT: NORMAL

## 2024-11-14 ENCOUNTER — APPOINTMENT (OUTPATIENT)
Dept: PRIMARY CARE | Facility: CLINIC | Age: 74
End: 2024-11-14
Payer: MEDICARE

## 2024-11-19 ENCOUNTER — OFFICE VISIT (OUTPATIENT)
Dept: PRIMARY CARE | Facility: CLINIC | Age: 74
End: 2024-11-19
Payer: MEDICARE

## 2024-11-19 VITALS
SYSTOLIC BLOOD PRESSURE: 134 MMHG | WEIGHT: 205 LBS | HEART RATE: 74 BPM | DIASTOLIC BLOOD PRESSURE: 85 MMHG | OXYGEN SATURATION: 93 % | HEIGHT: 61 IN | BODY MASS INDEX: 38.71 KG/M2

## 2024-11-19 DIAGNOSIS — E66.01 MORBID OBESITY DUE TO EXCESS CALORIES (MULTI): Primary | ICD-10-CM

## 2024-11-19 PROCEDURE — G2211 COMPLEX E/M VISIT ADD ON: HCPCS | Performed by: INTERNAL MEDICINE

## 2024-11-19 PROCEDURE — 3079F DIAST BP 80-89 MM HG: CPT | Performed by: INTERNAL MEDICINE

## 2024-11-19 PROCEDURE — 99213 OFFICE O/P EST LOW 20 MIN: CPT | Performed by: INTERNAL MEDICINE

## 2024-11-19 PROCEDURE — 3075F SYST BP GE 130 - 139MM HG: CPT | Performed by: INTERNAL MEDICINE

## 2024-11-19 PROCEDURE — 1159F MED LIST DOCD IN RCRD: CPT | Performed by: INTERNAL MEDICINE

## 2024-11-19 PROCEDURE — 3008F BODY MASS INDEX DOCD: CPT | Performed by: INTERNAL MEDICINE

## 2024-11-19 NOTE — PROGRESS NOTES
Primary Care Physician: Andrew Valle MD    Date of Visit: 11/19/2024     Chief Complaint:   Chief Complaint   Patient presents with    Follow-up         HPI:  HPI    Subjective:   Urvashi Alonso is a 74 y.o. female presents   Ins will no longer pay for the wegovy for the dx of prediabetes.     The letter sent to her indicated that the wegovy may be covered for wt loss in pt's with obesity.     The last dose of wegovy, 2.4mg was well tolerated.  However, she did not feel the appetite suppression that she had felt previously.  She has, however, continued to eat smaller portions.   She exercises 3 days per week--2 in the gym and she takes a yoga class.   She would like to continue with glp1.     Allergies:  No Known Allergies    Outpatient Medications:  Current Outpatient Medications   Medication Instructions    albuterol 90 mcg/actuation inhaler Inhale.    amLODIPine (NORVASC) 2.5 mg, oral, Daily    coQ10, ubiquinol, 100 mg capsule Take by mouth.    cyanocobalamin (Vitamin B-12) 100 mcg tablet Take by mouth. w/ magnesium 1000mg    diazePAM (Valium) 5 mg tablet     docusate sodium (Colace) 100 mg capsule 1 capsule, Every 12 hours scheduled (0630,1830)    estradiol (Vivelle-DOT) 0.05 mg/24 hr patch 1 patch, 2 times weekly    famotidine (PEPCID) 40 mg, oral, Daily    fluticasone (Flonase) 50 mcg/actuation nasal spray 1 spray, 2 times daily    meloxicam (MOBIC) 15 mg, Daily    mirabegron (MYRBETRIQ) 50 mg, oral, Daily    oxyCODONE (Roxicodone) 10 mg immediate release tablet TAKE ONE TABLET BY MOUTH EVERY 4 HOURS AS NEEDED FOR PAIN for 7 days    rOPINIRole (REQUIP) 1 mg, oral, 3 times daily PRN    tiZANidine (Zanaflex) 2 mg tablet TAKE 1 TO 2 TABLETS BY MOUTH TWICE A DAY AS NEEDED FOR LEFT RIB PAIN. WATCH FOR SEDATION    traMADol (Ultram) 50 mg tablet 1 tablet, Every 6 hours PRN    triamterene-hydrochlorothiazid (Dyazide) 37.5-25 mg capsule 1 capsule, oral, Daily    vit B-O4-mmgtdaljuuaye-vit K 2,000 unit- 2,000 mcg/mL  "liquid Take by mouth.    vitamin B comp and C no.3 (B Complex Plus Vitamin C) 15-10-50-5-300 mg capsule Take by mouth.    Wegovy 2.4 mg, subcutaneous, Once Weekly       ROS:   Review of Systems     Vitals:  /85 (BP Location: Left arm, Patient Position: Sitting, BP Cuff Size: Adult)   Pulse 74   Ht 1.549 m (5' 1\")   Wt 93 kg (205 lb)   SpO2 93%   BMI 38.73 kg/m²   Wt Readings from Last 3 Encounters:   11/19/24 93 kg (205 lb)   11/09/24 93.9 kg (207 lb)   10/01/24 93.9 kg (207 lb)     BP Readings from Last 3 Encounters:   11/19/24 134/85   11/09/24 136/84   10/01/24 122/67        Physical Exam:  Physical Exam     Assessment/Plan   Diagnoses and all orders for this visit:  Morbid obesity due to excess calories (Multi)  BMI 38.0-38.9,adult      Orders:  No orders of the defined types were placed in this encounter.       Followup Appts:  Future Appointments   Date Time Provider Department Center   1/2/2025  1:00 PM Andrew Valle MD [id number]           ____________________________________________________________  Andrew Valle MD  "

## 2024-11-21 ENCOUNTER — TELEPHONE (OUTPATIENT)
Dept: PRIMARY CARE | Facility: CLINIC | Age: 74
End: 2024-11-21
Payer: MEDICARE

## 2025-01-02 ENCOUNTER — APPOINTMENT (OUTPATIENT)
Dept: PRIMARY CARE | Facility: CLINIC | Age: 75
End: 2025-01-02
Payer: MEDICARE

## 2025-01-02 VITALS
SYSTOLIC BLOOD PRESSURE: 118 MMHG | WEIGHT: 213 LBS | BODY MASS INDEX: 39.2 KG/M2 | RESPIRATION RATE: 16 BRPM | HEART RATE: 69 BPM | DIASTOLIC BLOOD PRESSURE: 83 MMHG | HEIGHT: 62 IN

## 2025-01-02 DIAGNOSIS — N32.81 OVERACTIVE BLADDER: ICD-10-CM

## 2025-01-02 DIAGNOSIS — E66.01 MORBID OBESITY DUE TO EXCESS CALORIES (MULTI): Primary | ICD-10-CM

## 2025-01-02 DIAGNOSIS — N39.46 MIXED INCONTINENCE URGE AND STRESS: ICD-10-CM

## 2025-01-02 DIAGNOSIS — R73.03 PREDIABETES: ICD-10-CM

## 2025-01-02 DIAGNOSIS — M48.062 SPINAL STENOSIS, LUMBAR REGION WITH NEUROGENIC CLAUDICATION: ICD-10-CM

## 2025-01-02 DIAGNOSIS — M17.0 PRIMARY OSTEOARTHRITIS OF BOTH KNEES: ICD-10-CM

## 2025-01-02 PROCEDURE — 99214 OFFICE O/P EST MOD 30 MIN: CPT | Performed by: INTERNAL MEDICINE

## 2025-01-02 PROCEDURE — 1159F MED LIST DOCD IN RCRD: CPT | Performed by: INTERNAL MEDICINE

## 2025-01-02 PROCEDURE — 3074F SYST BP LT 130 MM HG: CPT | Performed by: INTERNAL MEDICINE

## 2025-01-02 PROCEDURE — 3008F BODY MASS INDEX DOCD: CPT | Performed by: INTERNAL MEDICINE

## 2025-01-02 PROCEDURE — 3079F DIAST BP 80-89 MM HG: CPT | Performed by: INTERNAL MEDICINE

## 2025-01-02 RX ORDER — MELOXICAM 15 MG/1
15 TABLET ORAL DAILY
Qty: 100 TABLET | Refills: 1 | Status: SHIPPED | OUTPATIENT
Start: 2025-01-02 | End: 2025-03-27 | Stop reason: SDUPTHER

## 2025-01-02 RX ORDER — MIRABEGRON 50 MG/1
50 TABLET, FILM COATED, EXTENDED RELEASE ORAL DAILY
Qty: 100 TABLET | Refills: 1 | Status: SHIPPED | OUTPATIENT
Start: 2025-01-02 | End: 2025-03-27 | Stop reason: SDUPTHER

## 2025-01-02 NOTE — PROGRESS NOTES
Primary Care Physician: Andrew Valle MD    Date of Visit: 01/02/2025     Chief Complaint:   Chief Complaint   Patient presents with    Follow-up     DISCUSS WEIGHT AND MED REFILLS          HPI:  HPI    Subjective:   Urvashi Alonso is a 74 y.o. female presents   Obesity  Did well on wegovy.  +decreased appetite and portion size.    Success with wt loss.  Ins will no longer cover wegovy.  The cash price is now $1600/month!!  She would like to trial zepbound from a [facility] pharmacy.     Overactive bladder.   On myrbetriq 50mg.  States that it helps.  Works well during the daytimeSometimes wakes up with urgency.   Hx of oa and spinal stenosis  Allergies:  No Known Allergies    Outpatient Medications:  Current Outpatient Medications   Medication Instructions    albuterol 90 mcg/actuation inhaler Inhale.    amLODIPine (NORVASC) 2.5 mg, oral, Daily    coQ10, ubiquinol, 100 mg capsule Take by mouth.    cyanocobalamin (Vitamin B-12) 100 mcg tablet Take by mouth. w/ magnesium 1000mg    diazePAM (Valium) 5 mg tablet     docusate sodium (Colace) 100 mg capsule 1 capsule, Every 12 hours scheduled (0630,1830)    estradiol (Vivelle-DOT) 0.05 mg/24 hr patch 1 patch, 2 times weekly    famotidine (PEPCID) 40 mg, oral, Daily    fluticasone (Flonase) 50 mcg/actuation nasal spray 1 spray, 2 times daily    meloxicam (MOBIC) 15 mg, Daily    mirabegron (MYRBETRIQ) 50 mg, oral, Daily    rOPINIRole (REQUIP) 1 mg, oral, 3 times daily PRN    tirzepatide 10 mg, subcutaneous, Every 7 days    tiZANidine (Zanaflex) 2 mg tablet TAKE 1 TO 2 TABLETS BY MOUTH TWICE A DAY AS NEEDED FOR LEFT RIB PAIN. WATCH FOR SEDATION    traMADol (Ultram) 50 mg tablet 1 tablet, Every 6 hours PRN    triamterene-hydrochlorothiazid (Dyazide) 37.5-25 mg capsule 1 capsule, oral, Daily    vit I-H0-stiwjynuycjjk-vit K 2,000 unit- 2,000 mcg/mL liquid Take by mouth.    vitamin B comp and C no.3 (B Complex Plus Vitamin C) 15-10-50-5-300 mg capsule Take by mouth.     "Wegovy 2.4 mg, subcutaneous, Once Weekly       ROS:   Review of Systems     Vitals:  /83   Pulse 69   Resp 16   Ht 1.575 m (5' 2\")   Wt 96.6 kg (213 lb)   BMI 38.96 kg/m²   Wt Readings from Last 3 Encounters:   01/02/25 96.6 kg (213 lb)   11/19/24 93 kg (205 lb)   11/09/24 93.9 kg (207 lb)     BP Readings from Last 3 Encounters:   01/02/25 118/83   11/19/24 134/85   11/09/24 136/84        Physical Exam:  Physical Exam  Vitals reviewed.   Constitutional:       Appearance: Normal appearance. She is obese.   HENT:      Head: Normocephalic and atraumatic.   Cardiovascular:      Rate and Rhythm: Normal rate and regular rhythm.      Heart sounds: Normal heart sounds, S1 normal and S2 normal. No murmur heard.  Pulmonary:      Effort: Pulmonary effort is normal.      Breath sounds: Normal breath sounds. No wheezing, rhonchi or rales.   Abdominal:      General: Bowel sounds are normal.      Palpations: Abdomen is soft.   Skin:     General: Skin is warm and dry.   Neurological:      General: No focal deficit present.      Mental Status: She is alert and oriented to person, place, and time.          Assessment/Plan   Diagnoses and all orders for this visit:  Morbid obesity due to excess calories (Multi)  BMI 38.0-38.9,adult  Mixed incontinence urge and stress  Spinal stenosis, lumbar region with neurogenic claudication  Primary osteoarthritis of both knees  Prediabetes      Orders:  No orders of the defined types were placed in this encounter.       Followup Appts:  No future appointments.        ____________________________________________________________  Andrew Valle MD  "

## 2025-02-03 ENCOUNTER — APPOINTMENT (OUTPATIENT)
Dept: PRIMARY CARE | Facility: CLINIC | Age: 75
End: 2025-02-03
Payer: MEDICARE

## 2025-02-03 DIAGNOSIS — Z79.899 ENCOUNTER FOR LONG-TERM (CURRENT) USE OF MEDICATIONS: Primary | ICD-10-CM

## 2025-02-03 PROCEDURE — 99213 OFFICE O/P EST LOW 20 MIN: CPT | Performed by: INTERNAL MEDICINE

## 2025-02-03 PROCEDURE — G2211 COMPLEX E/M VISIT ADD ON: HCPCS | Performed by: INTERNAL MEDICINE

## 2025-02-10 LAB
ANION GAP SERPL CALCULATED.4IONS-SCNC: 13 MMOL/L (CALC) (ref 7–17)
BUN SERPL-MCNC: 24 MG/DL (ref 7–25)
BUN/CREAT SERPL: NORMAL (CALC) (ref 6–22)
CALCIUM SERPL-MCNC: 9.6 MG/DL (ref 8.6–10.4)
CHLORIDE SERPL-SCNC: 101 MMOL/L (ref 98–110)
CO2 SERPL-SCNC: 25 MMOL/L (ref 20–32)
CREAT SERPL-MCNC: 0.85 MG/DL (ref 0.6–1)
EGFRCR SERPLBLD CKD-EPI 2021: 72 ML/MIN/1.73M2
GLUCOSE SERPL-MCNC: 86 MG/DL (ref 65–139)
POTASSIUM SERPL-SCNC: 5.2 MMOL/L (ref 3.5–5.3)
SODIUM SERPL-SCNC: 139 MMOL/L (ref 135–146)

## 2025-03-05 ENCOUNTER — APPOINTMENT (OUTPATIENT)
Dept: PRIMARY CARE | Facility: CLINIC | Age: 75
End: 2025-03-05
Payer: MEDICARE

## 2025-03-05 VITALS
BODY MASS INDEX: 38.68 KG/M2 | HEART RATE: 71 BPM | HEIGHT: 62 IN | RESPIRATION RATE: 20 BRPM | WEIGHT: 210.2 LBS | SYSTOLIC BLOOD PRESSURE: 120 MMHG | TEMPERATURE: 97.4 F | OXYGEN SATURATION: 98 % | DIASTOLIC BLOOD PRESSURE: 72 MMHG

## 2025-03-05 DIAGNOSIS — E66.01 MORBID OBESITY DUE TO EXCESS CALORIES (MULTI): Primary | ICD-10-CM

## 2025-03-05 PROCEDURE — 1159F MED LIST DOCD IN RCRD: CPT | Performed by: INTERNAL MEDICINE

## 2025-03-05 PROCEDURE — 3074F SYST BP LT 130 MM HG: CPT | Performed by: INTERNAL MEDICINE

## 2025-03-05 PROCEDURE — 99213 OFFICE O/P EST LOW 20 MIN: CPT | Performed by: INTERNAL MEDICINE

## 2025-03-05 PROCEDURE — 3008F BODY MASS INDEX DOCD: CPT | Performed by: INTERNAL MEDICINE

## 2025-03-05 PROCEDURE — 3078F DIAST BP <80 MM HG: CPT | Performed by: INTERNAL MEDICINE

## 2025-03-05 NOTE — PROGRESS NOTES
Date of Visit: 03/05/2025     Chief Complaint:   Chief Complaint   Patient presents with    Follow-up     1 month with Wegovy         HPI:  HPI  Subjective:   Urvashi Alonso is a 74 y.o. female presents   Obesity   Day after shot 'feels yucky', but after that, she feels good.  She is losing wt, slowly, but steadily.    She would like to continue the wegovy 1.7mg mg.    ROS:   Review of Systems      Outpatient Medications:  Current Outpatient Medications   Medication Instructions    amLODIPine (NORVASC) 2.5 mg, oral, Daily    famotidine (PEPCID) 40 mg, oral, Daily    fluticasone (Flonase) 50 mcg/actuation nasal spray 1 spray, 2 times daily    meloxicam (MOBIC) 15 mg, oral, Daily    mirabegron (MYRBETRIQ) 50 mg, oral, Daily    rOPINIRole (REQUIP) 1 mg, oral, 3 times daily PRN    traMADol (Ultram) 50 mg tablet 1 tablet, Every 6 hours PRN    triamterene-hydrochlorothiazid (Dyazide) 37.5-25 mg capsule 1 capsule, oral, Daily    vitamin B comp and C no.3 (B Complex Plus Vitamin C) 15-10-50-5-300 mg capsule Take by mouth.       Allergies:  No Known Allergies    Past Medical History:   Diagnosis Date    BPPV (benign paroxysmal positional vertigo), unspecified laterality 10/28/2023    Dizziness 11/09/2024    Gouty arthritis of toe 09/25/2019    Lower respiratory tract infection 11/09/2024    Personal history of other diseases of the circulatory system     H/O: HTN (hypertension)        Health Maintenance   Topic Date Due    Medicare Annual Wellness Visit (AWV)  Never done    Bone Density Scan  Never done    Hepatitis C Screening  Never done    DTaP/Tdap/Td Vaccines (1 - Tdap) Never done    Pneumococcal Vaccine (1 of 1 - PCV) Never done    Zoster Vaccines (2 of 3) 11/12/2015    Colorectal Cancer Screening  01/19/2024    Influenza Vaccine (1) Never done    COVID-19 Vaccine (2 - 2024-25 season) 09/01/2024    Mammogram  08/15/2025    Diabetes: Hemoglobin A1C  10/01/2025    RSV High Risk: (Elderly (60+) or Pregnant  Population) (1 - 1-dose 75+ series) 11/17/2025    Lipid Panel  03/01/2029    HIB Vaccines  Aged Out    Hepatitis B Vaccines  Aged Out    IPV Vaccines  Aged Out    Hepatitis A Vaccines  Aged Out    Meningococcal Vaccine  Aged Out    Rotavirus Vaccines  Aged Out    HPV Vaccines  Aged Out       Past Surgical History:   Procedure Laterality Date    CHOLECYSTECTOMY  06/08/2017    Cholecystectomy    HIP SURGERY  06/08/2017    Hip Surgery    HYSTERECTOMY  06/08/2017    Hysterectomy    TOTAL KNEE ARTHROPLASTY  06/08/2017    Knee Replacement    TUBAL LIGATION  06/08/2017    Tubal Ligation       Family History   Problem Relation Name Age of Onset    Other (HEART PROBLEM) Mother      Cancer Father           Social History     Socioeconomic History    Marital status:    Tobacco Use    Smoking status: Never     Passive exposure: Never    Smokeless tobacco: Never   Substance and Sexual Activity    Alcohol use: Not Currently    Drug use: Never     Social Drivers of Health     Financial Resource Strain: Low Risk  (11/11/2023)    Received from [employer]    Overall Financial Resource Strain (CARDIA)     Difficulty of Paying Living Expenses: Not hard at all   Food Insecurity: Unknown (11/11/2023)    Received from [employer]    Hunger Vital Sign     Worried About Running Out of Food in the Last Year: Never true   Transportation Needs: No Transportation Needs (11/11/2023)    Received from [employer]    PRAPARE - Transportation     Lack of Transportation (Medical): No     Lack of Transportation (Non-Medical): No   Physical Activity: Insufficiently Active (11/11/2023)    Received from [employer]    Exercise Vital Sign     Days of Exercise per Week: 1 day     Minutes of Exercise per Session: 60 min   Stress: No Stress Concern Present (11/11/2023)    Received from [employer]    [place] [employer] - Occupational Stress  "Questionnaire     Feeling of Stress : Not at all   Social Connections: Unknown (11/11/2023)    Received from [employer]    Social Connection and Isolation Panel [NHANES]     Frequency of Communication with Friends and Family: More than three times a week     Frequency of Social Gatherings with Friends and Family: More than three times a week     Attends Jew Services: More than 4 times per year     Active Member of Clubs or Organizations: Yes   Intimate Partner Violence: Not At Risk (11/11/2023)    Received from [employer]    Humiliation, Afraid, Rape, and Kick questionnaire     Fear of Current or Ex-Partner: No     Emotionally Abused: No     Physically Abused: No     Sexually Abused: No   Housing Stability: Low Risk  (11/11/2023)    Received from [employer]    Housing Stability Vital Sign     Unable to Pay for Housing in the Last Year: No     Number of Places Lived in the Last Year: 1     Unstable Housing in the Last Year: No          Vitals:  /72 (BP Location: Left arm, Patient Position: Sitting, BP Cuff Size: Adult)   Pulse 71   Temp 36.3 °C (97.4 °F) (Temporal)   Resp 20   Ht 1.575 m (5' 2\")   Wt 95.3 kg (210 lb 3.2 oz)   SpO2 98%   BMI 38.45 kg/m²   Wt Readings from Last 3 Encounters:   03/05/25 95.3 kg (210 lb 3.2 oz)   01/02/25 96.6 kg (213 lb)   11/19/24 93 kg (205 lb)     BP Readings from Last 3 Encounters:   03/05/25 120/72   01/02/25 118/83   11/19/24 134/85        Physical Exam:  Physical Exam  Vitals reviewed.   Constitutional:       Appearance: Normal appearance.   HENT:      Head: Normocephalic and atraumatic.   Cardiovascular:      Rate and Rhythm: Normal rate and regular rhythm.      Heart sounds: Normal heart sounds, S1 normal and S2 normal. No murmur heard.  Pulmonary:      Effort: Pulmonary effort is normal.      Breath sounds: Normal breath sounds. No wheezing, rhonchi or rales.   Abdominal:      General: Bowel sounds are normal. "      Palpations: Abdomen is soft.   Skin:     General: Skin is warm and dry.   Neurological:      General: No focal deficit present.      Mental Status: She is alert and oriented to person, place, and time.          Assessment/Plan   Diagnoses and all orders for this visit:  Morbid obesity due to excess calories (Multi)  BMI 38.0-38.9,adult  keep same dose--1.7mg-- for the next month  Then,revisit on 4/1--she is going to call.  [facility]   She does not need a new script at this time.     Orders:  No orders of the defined types were placed in this encounter.       Followup Appts:  No future appointments.         ____________________________________________________________  Andrew Valle MD

## 2025-03-22 DIAGNOSIS — M48.062 SPINAL STENOSIS, LUMBAR REGION WITH NEUROGENIC CLAUDICATION: ICD-10-CM

## 2025-03-22 DIAGNOSIS — M17.0 PRIMARY OSTEOARTHRITIS OF BOTH KNEES: ICD-10-CM

## 2025-03-22 DIAGNOSIS — I10 ESSENTIAL (PRIMARY) HYPERTENSION: ICD-10-CM

## 2025-03-22 DIAGNOSIS — K21.9 GASTROESOPHAGEAL REFLUX DISEASE WITHOUT ESOPHAGITIS: ICD-10-CM

## 2025-03-22 DIAGNOSIS — N39.46 MIXED INCONTINENCE URGE AND STRESS: ICD-10-CM

## 2025-03-27 RX ORDER — MELOXICAM 15 MG/1
15 TABLET ORAL DAILY
Qty: 90 TABLET | Refills: 1 | Status: SHIPPED | OUTPATIENT
Start: 2025-03-27

## 2025-03-27 RX ORDER — TRIAMTERENE AND HYDROCHLOROTHIAZIDE 37.5; 25 MG/1; MG/1
1 CAPSULE ORAL DAILY
Qty: 90 CAPSULE | Refills: 1 | Status: SHIPPED | OUTPATIENT
Start: 2025-03-27

## 2025-03-27 RX ORDER — MIRABEGRON 50 MG/1
50 TABLET, FILM COATED, EXTENDED RELEASE ORAL DAILY
Qty: 90 TABLET | Refills: 1 | Status: SHIPPED | OUTPATIENT
Start: 2025-03-27

## 2025-03-27 RX ORDER — AMLODIPINE BESYLATE 2.5 MG/1
2.5 TABLET ORAL DAILY
Qty: 90 TABLET | Refills: 1 | Status: SHIPPED | OUTPATIENT
Start: 2025-03-27

## 2025-03-27 RX ORDER — FAMOTIDINE 40 MG/1
40 TABLET, FILM COATED ORAL DAILY
Qty: 90 TABLET | Refills: 1 | Status: SHIPPED | OUTPATIENT
Start: 2025-03-27

## 2025-04-01 ENCOUNTER — PATIENT MESSAGE (OUTPATIENT)
Dept: PRIMARY CARE | Facility: CLINIC | Age: 75
End: 2025-04-01
Payer: MEDICARE

## 2025-04-04 PROBLEM — N32.81 OVERACTIVE BLADDER: Status: ACTIVE | Noted: 2025-04-04

## 2025-04-22 RX ORDER — TRAMADOL HYDROCHLORIDE 50 MG/1
50 TABLET ORAL EVERY 6 HOURS PRN
Qty: 10 TABLET | Refills: 0 | OUTPATIENT
Start: 2025-04-22

## 2025-04-22 NOTE — TELEPHONE ENCOUNTER
I have not prescribed this medication for her.  That is a controlled substance and she needs an appt.  She can contact the previous provider if she can not or does not wish to schedule an appt.

## 2025-04-30 ENCOUNTER — OFFICE VISIT (OUTPATIENT)
Dept: URGENT CARE | Facility: CLINIC | Age: 75
End: 2025-04-30
Payer: MEDICARE

## 2025-04-30 VITALS
SYSTOLIC BLOOD PRESSURE: 136 MMHG | TEMPERATURE: 98.4 F | HEART RATE: 86 BPM | OXYGEN SATURATION: 93 % | DIASTOLIC BLOOD PRESSURE: 87 MMHG

## 2025-04-30 DIAGNOSIS — J20.9 ACUTE BRONCHITIS, UNSPECIFIED ORGANISM: Primary | ICD-10-CM

## 2025-04-30 PROCEDURE — 99214 OFFICE O/P EST MOD 30 MIN: CPT | Performed by: PHYSICIAN ASSISTANT

## 2025-04-30 PROCEDURE — 3075F SYST BP GE 130 - 139MM HG: CPT | Performed by: PHYSICIAN ASSISTANT

## 2025-04-30 PROCEDURE — 1159F MED LIST DOCD IN RCRD: CPT | Performed by: PHYSICIAN ASSISTANT

## 2025-04-30 PROCEDURE — 3079F DIAST BP 80-89 MM HG: CPT | Performed by: PHYSICIAN ASSISTANT

## 2025-04-30 RX ORDER — DOXYCYCLINE 100 MG/1
100 CAPSULE ORAL 2 TIMES DAILY
Qty: 14 CAPSULE | Refills: 0 | Status: SHIPPED | OUTPATIENT
Start: 2025-04-30 | End: 2025-05-07

## 2025-04-30 RX ORDER — AZITHROMYCIN 250 MG/1
TABLET, FILM COATED ORAL
Qty: 6 TABLET | Refills: 0 | Status: SHIPPED | OUTPATIENT
Start: 2025-04-30 | End: 2025-04-30 | Stop reason: WASHOUT

## 2025-04-30 RX ORDER — ALBUTEROL SULFATE 90 UG/1
2 INHALANT RESPIRATORY (INHALATION) EVERY 6 HOURS PRN
Qty: 18 G | Refills: 0 | Status: SHIPPED | OUTPATIENT
Start: 2025-04-30 | End: 2025-05-30

## 2025-04-30 ASSESSMENT — ENCOUNTER SYMPTOMS: COUGH: 1

## 2025-04-30 NOTE — PROGRESS NOTES
"Subjective   Patient ID: Urvashi Alonso is a 74 y.o. female.    Patient presents with reports of dry cough, negative COVID, at home yesterday Sx x 4 days, coughing fits, chest congestion , denies sinus Sx, denies chills, aches, denies GI upset. No known sick contacts. She has had bronchitis before with similar Sx. She has been using an \"old prescription\" of Amoxicillin (6 doses) and Tessalon perles without improvement. Denies: fever, chills, headache, dizziness, CP, SOB, abdominal pain, N/V/D, rash, swelling, bruising, ear pain, sore throat, loss of sense of taste/smell.       History provided by:  Patient  Cough      Review of Systems   Respiratory:  Positive for cough.    All other systems reviewed and are negative.    Objective     Physical Exam  Vitals reviewed.   Constitutional:       General: She is awake.      Appearance: Normal appearance. She is well-developed.   HENT:      Head: Normocephalic and atraumatic.      Right Ear: Tympanic membrane and ear canal normal.      Left Ear: Tympanic membrane and ear canal normal.      Nose: Nose normal.      Mouth/Throat:      Lips: Pink.      Mouth: Mucous membranes are moist.      Pharynx: Uvula midline. Posterior oropharyngeal erythema and postnasal drip present.   Cardiovascular:      Rate and Rhythm: Normal rate and regular rhythm.   Pulmonary:      Effort: Pulmonary effort is normal.      Breath sounds: Decreased air movement present. Examination of the right-lower field reveals rales. Decreased breath sounds, wheezing and rales present. No rhonchi.   Musculoskeletal:      Cervical back: Full passive range of motion without pain.      Right lower leg: No edema.      Left lower leg: No edema.   Skin:     General: Skin is warm and dry.      Findings: No lesion or rash.   Neurological:      General: No focal deficit present.      Mental Status: She is alert and oriented to person, place, and time.      Cranial Nerves: No facial asymmetry.      Motor: Motor function " is intact.      Gait: Gait is intact.   Psychiatric:         Attention and Perception: Attention normal.         Mood and Affect: Mood and affect normal.         Assessment/Plan   Problem List Items Addressed This Visit    None  Visit Diagnoses         Codes      Acute bronchitis, unspecified organism    -  Primary J20.9    Relevant Medications    albuterol 90 mcg/actuation inhaler    doxycycline (Monodox) 100 mg capsule          Medications Rx as above for lower respiratory infection  Increase rest and fluids   Possible early PNA in the RLL based on exam findings   ER if worsening Sx, call with any new Sx or questions     Red flag symptoms reviewed with patient and all questions answered. Patient or parent/guardian verbalized understanding and agreement with care plan as above. All in office testing reviewed with patient. If symptoms worsen or do not improve, patient is to follow up with PCP or report to the ER.      Patient disposition: Home

## 2025-05-30 NOTE — PROGRESS NOTES
Primary Care Physician: Andrew Valle MD    Date of Visit: 02/03/2025   Virtual or Telephone Consent    An interactive audio and video telecommunication system which permits real time communications between the patient (at the originating site) and provider (at the distant site) was utilized to provide this telehealth service.   Verbal consent was requested and obtained from Urvashi Alonso on this date, 2/3/25 for a telehealth visit.     Chief Complaint:   No chief complaint on file.    HPI:  HPI    Subjective:   Urvashi Alonso is a 74 y.o. female presents   F/up obesity  Pt did try the zepbound.  States that she thinks that it caused a rash.  Would like to go back to [facility].  Cost is an issue.  Dwp that she does not have to use the highest dose of medication (cost is based on dose through the compound pharmacy).  She will/should continue to have wt loss at lower dose.     Allergies:  No Known Allergies    Outpatient Medications:  Current Outpatient Medications   Medication Instructions    albuterol 90 mcg/actuation inhaler Inhale.    amLODIPine (NORVASC) 2.5 mg, oral, Daily    coQ10, ubiquinol, 100 mg capsule Take by mouth.    cyanocobalamin (Vitamin B-12) 100 mcg tablet Take by mouth. w/ magnesium 1000mg    diazePAM (Valium) 5 mg tablet     docusate sodium (Colace) 100 mg capsule 1 capsule, Every 12 hours scheduled (0630,1830)    estradiol (Vivelle-DOT) 0.05 mg/24 hr patch 1 patch, 2 times weekly    famotidine (PEPCID) 40 mg, oral, Daily    fluticasone (Flonase) 50 mcg/actuation nasal spray 1 spray, 2 times daily    meloxicam (MOBIC) 15 mg, oral, Daily    mirabegron (MYRBETRIQ) 50 mg, oral, Daily    rOPINIRole (REQUIP) 1 mg, oral, 3 times daily PRN    tirzepatide (weight loss) (ZEPBOUND) 2.5 mg, subcutaneous, Every 7 days    tiZANidine (Zanaflex) 2 mg tablet TAKE 1 TO 2 TABLETS BY MOUTH TWICE A DAY AS NEEDED FOR LEFT RIB PAIN. WATCH FOR SEDATION    traMADol (Ultram) 50 mg tablet 1 tablet, Every 6 hours PRN     triamterene-hydrochlorothiazid (Dyazide) 37.5-25 mg capsule 1 capsule, oral, Daily    vit F-[id number]-vit K 2,000 unit- 2,000 mcg/mL liquid Take by mouth.    vitamin B comp and C no.3 (B Complex Plus Vitamin C) 15-10-50-5-300 mg capsule Take by mouth.       ROS:   Review of Systems     Vitals:  There were no vitals taken for this visit.  Wt Readings from Last 3 Encounters:   01/02/25 96.6 kg (213 lb)   11/19/24 93 kg (205 lb)   11/09/24 93.9 kg (207 lb)     BP Readings from Last 3 Encounters:   01/02/25 118/83   11/19/24 134/85   11/09/24 136/84        Physical Exam:  Physical Exam     No exam.  Telehealth.    Assessment/Plan   Diagnoses and all orders for this visit:  Encounter for long-term (current) use of medications  -     Basic metabolic panel; Future      Orders:  No orders of the defined types were placed in this encounter.       Followup Appts:  No future appointments.     Rto 1 month.  Check [id number].     ____________________________________________________________  Andrew Valle MD   normal... Well appearing, awake, alert, oriented to person, place, time/situation and in no apparent distress.

## 2025-06-05 ENCOUNTER — APPOINTMENT (OUTPATIENT)
Dept: PRIMARY CARE | Facility: CLINIC | Age: 75
End: 2025-06-05
Payer: MEDICARE

## 2025-06-05 ENCOUNTER — OFFICE VISIT (OUTPATIENT)
Dept: PRIMARY CARE | Facility: CLINIC | Age: 75
End: 2025-06-05
Payer: MEDICARE

## 2025-06-05 VITALS
OXYGEN SATURATION: 97 % | TEMPERATURE: 97.6 F | RESPIRATION RATE: 18 BRPM | HEIGHT: 62 IN | SYSTOLIC BLOOD PRESSURE: 138 MMHG | BODY MASS INDEX: 38.13 KG/M2 | DIASTOLIC BLOOD PRESSURE: 70 MMHG | HEART RATE: 68 BPM | WEIGHT: 207.2 LBS

## 2025-06-05 DIAGNOSIS — G25.81 RESTLESS LEG SYNDROME: ICD-10-CM

## 2025-06-05 DIAGNOSIS — M15.0 PRIMARY OSTEOARTHRITIS INVOLVING MULTIPLE JOINTS: ICD-10-CM

## 2025-06-05 DIAGNOSIS — H25.813 COMBINED FORMS OF AGE-RELATED CATARACT OF BOTH EYES: Primary | ICD-10-CM

## 2025-06-05 PROCEDURE — 1159F MED LIST DOCD IN RCRD: CPT | Performed by: INTERNAL MEDICINE

## 2025-06-05 PROCEDURE — 3078F DIAST BP <80 MM HG: CPT | Performed by: INTERNAL MEDICINE

## 2025-06-05 PROCEDURE — 3075F SYST BP GE 130 - 139MM HG: CPT | Performed by: INTERNAL MEDICINE

## 2025-06-05 PROCEDURE — 3008F BODY MASS INDEX DOCD: CPT | Performed by: INTERNAL MEDICINE

## 2025-06-05 PROCEDURE — 99214 OFFICE O/P EST MOD 30 MIN: CPT | Performed by: INTERNAL MEDICINE

## 2025-06-05 RX ORDER — TRAMADOL HYDROCHLORIDE 50 MG/1
50 TABLET, FILM COATED ORAL EVERY 12 HOURS PRN
Qty: 60 TABLET | Refills: 2 | Status: SHIPPED | OUTPATIENT
Start: 2025-06-05 | End: 2025-09-03

## 2025-06-05 ASSESSMENT — PATIENT HEALTH QUESTIONNAIRE - PHQ9
1. LITTLE INTEREST OR PLEASURE IN DOING THINGS: NOT AT ALL
SUM OF ALL RESPONSES TO PHQ9 QUESTIONS 1 AND 2: 0
2. FEELING DOWN, DEPRESSED OR HOPELESS: NOT AT ALL

## 2025-06-05 NOTE — PROGRESS NOTES
Date of Visit: 06/05/2025     Chief Complaint:   Chief Complaint   Patient presents with    surgical clearance     cataract       HPI:  HPI  Subjective:   Urvashi Alonso is a 74 y.o. female presents pre-op clearance for cataract surgery  Pt to have cataract surgery performed by Dr Elvia Don  Right 6/19/25 July3, left eye  At this time, she voices no c/o.    Denies chest pain/sob/abd pain/n/v/c/d.   She has had and tolerated anesthesia    C/o right hip pain.    She has been seen by ortho and may need to have a surgical procedure.   Pain is daily.  Worse with ambulation.    She had been on meloxicam, but was told to stop it after one of the surgeries she had last yr.   She is not sure why it was stopped.    Notes that in the past, the meloxicam worked well  Recommended that she restart it.     ROS:   Review of Systems   Constitutional: Negative.    HENT: Negative.     Eyes:  Positive for visual disturbance.   Respiratory: Negative.     Cardiovascular: Negative.    Gastrointestinal: Negative.    Endocrine: Negative.    Genitourinary: Negative.    Musculoskeletal:  Positive for arthralgias (right hip pain).   Skin: Negative.    Allergic/Immunologic: Negative.    Neurological: Negative.    Hematological: Negative.    Psychiatric/Behavioral: Negative.           Outpatient Medications:  Current Outpatient Medications   Medication Instructions    albuterol 90 mcg/actuation inhaler 2 puffs, inhalation, Every 6 hours PRN    amLODIPine (NORVASC) 2.5 mg, oral, Daily    famotidine (PEPCID) 40 mg, oral, Daily    fluticasone (Flonase) 50 mcg/actuation nasal spray 1 spray, 2 times daily    meloxicam (MOBIC) 15 mg, oral, Daily    mirabegron (MYRBETRIQ) 50 mg, oral, Daily    rOPINIRole (REQUIP) 1 mg, oral, 3 times daily PRN    traMADol (ULTRAM) 50 mg, oral, Every 12 hours PRN    triamterene-hydrochlorothiazid (Dyazide) 37.5-25 mg capsule 1 capsule, oral, Daily    vitamin B comp and C no.3 (B Complex Plus Vitamin C)  "15-10-50-5-300 mg capsule Take by mouth.       Allergies:  RX Allergies[1]    Medical History[2]     Health Maintenance   Topic Date Due    Medicare Annual Wellness Visit (AWV)  Never done    Bone Density Scan  Never done    Hepatitis C Screening  Never done    DTaP/Tdap/Td Vaccines (1 - Tdap) Never done    Pneumococcal Vaccine (1 of 1 - PCV) Never done    Zoster Vaccines (2 of 3) 11/12/2015    Colorectal Cancer Screening  01/19/2024    COVID-19 Vaccine (2 - 2024-25 season) 09/01/2024    Mammogram  08/15/2025    Influenza Vaccine (Season Ended) 09/01/2025    Diabetes: Hemoglobin A1C  10/01/2025    RSV High Risk: (Elderly (60+) or Pregnant Population) (1 - 1-dose 75+ series) 11/17/2025    Lipid Panel  03/01/2029    HIB Vaccines  Aged Out    Hepatitis B Vaccines  Aged Out    IPV Vaccines  Aged Out    Hepatitis A Vaccines  Aged Out    Meningococcal Vaccine  Aged Out    Rotavirus Vaccines  Aged Out    HPV Vaccines  Aged Out       Surgical History[3]    Family History[4]      Social History[5]       Vitals:  /70 (BP Location: Left arm, Patient Position: Sitting, BP Cuff Size: Adult)   Pulse 68   Temp 36.4 °C (97.6 °F) (Temporal)   Resp 18   Ht 1.575 m (5' 2\")   Wt 94 kg (207 lb 3.2 oz)   SpO2 97%   BMI 37.90 kg/m²   Wt Readings from Last 3 Encounters:   06/05/25 94 kg (207 lb 3.2 oz)   03/05/25 95.3 kg (210 lb 3.2 oz)   01/02/25 96.6 kg (213 lb)     BP Readings from Last 3 Encounters:   06/05/25 138/70   04/30/25 136/87   03/05/25 120/72        Physical Exam:  Physical Exam  Vitals reviewed.   Constitutional:       Appearance: Normal appearance.   HENT:      Head: Normocephalic and atraumatic.   Cardiovascular:      Rate and Rhythm: Normal rate and regular rhythm.      Heart sounds: Normal heart sounds, S1 normal and S2 normal. No murmur heard.  Pulmonary:      Effort: Pulmonary effort is normal.      Breath sounds: Normal breath sounds. No wheezing, rhonchi or rales.   Abdominal:      General: Bowel sounds " are normal.      Palpations: Abdomen is soft.   Skin:     General: Skin is warm and dry.   Neurological:      General: No focal deficit present.      Mental Status: She is alert and oriented to person, place, and time.          Assessment/Plan   Diagnoses and all orders for this visit:  Age-related cataract of both eyes, unspecified age-related cataract type  Primary osteoarthritis involving multiple joints  -     traMADol (Ultram) 50 mg tablet; Take 1 tablet (50 mg) by mouth every 12 hours if needed for severe pain (7 - 10).  Restless leg syndrome  -     rOPINIRole (Requip) 1 mg tablet; Take 1 tablet (1 mg) by mouth 3 times a day as needed (*).    Pt is medically cleared for cataract surgery.     Orders:  No orders of the defined types were placed in this encounter.       Followup Appts:  Future Appointments   Date Time Provider Department Center   6/30/2025  9:40 AM Andrew Valle MD [id number]        ____________________________________________________________  Andrew Valle MD         [1] No Known Allergies  [2]   Past Medical History:  Diagnosis Date    BPPV (benign paroxysmal positional vertigo), unspecified laterality 10/28/2023    Dizziness 11/09/2024    Gouty arthritis of toe 09/25/2019    Lower respiratory tract infection 11/09/2024    Personal history of other diseases of the circulatory system     H/O: HTN (hypertension)   [3]   Past Surgical History:  Procedure Laterality Date    CHOLECYSTECTOMY  06/08/2017    Cholecystectomy    HIP SURGERY  06/08/2017    Hip Surgery    HYSTERECTOMY  06/08/2017    Hysterectomy    TOTAL KNEE ARTHROPLASTY  06/08/2017    Knee Replacement    TUBAL LIGATION  06/08/2017    Tubal Ligation   [4]   Family History  Problem Relation Name Age of Onset    Other (HEART PROBLEM) Mother      Cancer Father     [5]   Social History  Socioeconomic History    Marital status:    Tobacco Use    Smoking status: Never     Passive exposure: Never    Smokeless tobacco: Never    Substance and Sexual Activity    Alcohol use: Not Currently    Drug use: Never     Social Drivers of Health     Financial Resource Strain: Low Risk  (11/11/2023)    Received from [employer]    Overall Financial Resource Strain (CARDIA)     Difficulty of Paying Living Expenses: Not hard at all   Food Insecurity: Unknown (11/11/2023)    Received from [employer]    Hunger Vital Sign     Worried About Running Out of Food in the Last Year: Never true   Transportation Needs: No Transportation Needs (11/11/2023)    Received from [employer]    PRAPARE - Transportation     Lack of Transportation (Medical): No     Lack of Transportation (Non-Medical): No   Physical Activity: Insufficiently Active (11/11/2023)    Received from [employer]    Exercise Vital Sign     Days of Exercise per Week: 1 day     Minutes of Exercise per Session: 60 min   Stress: No Stress Concern Present (11/11/2023)    Received from [employer]    [place] [employer] - Occupational Stress Questionnaire     Feeling of Stress : Not at all   Social Connections: Unknown (11/11/2023)    Received from [employer]    Social Connection and Isolation Panel [NHANES]     Frequency of Communication with Friends and Family: More than three times a week     Frequency of Social Gatherings with Friends and Family: More than three times a week     Attends Denominational Services: More than 4 times per year     Active Member of Clubs or Organizations: Yes   Intimate Partner Violence: Not At Risk (11/11/2023)    Received from [employer]    Humiliation, Afraid, Rape, and Kick questionnaire     Fear of Current or Ex-Partner: No     Emotionally Abused: No     Physically Abused: No     Sexually Abused: No   Housing Stability: Low Risk  (11/11/2023)    Received from [employer]    Housing Stability Vital Sign     Unable to Pay for Housing in the Last Year: No     Number of Places Lived in the Last Year: 1     Unstable Housing in the Last Year: No

## 2025-06-11 RX ORDER — ROPINIROLE 1 MG/1
1 TABLET, FILM COATED ORAL 3 TIMES DAILY PRN
Qty: 300 TABLET | Refills: 0 | Status: SHIPPED | OUTPATIENT
Start: 2025-06-11

## 2025-06-13 PROBLEM — H25.813 COMBINED FORMS OF AGE-RELATED CATARACT OF BOTH EYES: Status: ACTIVE | Noted: 2025-06-13

## 2025-06-13 ASSESSMENT — ENCOUNTER SYMPTOMS
PSYCHIATRIC NEGATIVE: 1
GASTROINTESTINAL NEGATIVE: 1
CONSTITUTIONAL NEGATIVE: 1
ENDOCRINE NEGATIVE: 1
ARTHRALGIAS: 1
ALLERGIC/IMMUNOLOGIC NEGATIVE: 1
RESPIRATORY NEGATIVE: 1
NEUROLOGICAL NEGATIVE: 1
CARDIOVASCULAR NEGATIVE: 1
HEMATOLOGIC/LYMPHATIC NEGATIVE: 1

## 2025-06-30 ENCOUNTER — APPOINTMENT (OUTPATIENT)
Dept: PRIMARY CARE | Facility: CLINIC | Age: 75
End: 2025-06-30
Payer: MEDICARE

## 2025-06-30 VITALS
OXYGEN SATURATION: 98 % | DIASTOLIC BLOOD PRESSURE: 80 MMHG | TEMPERATURE: 97.8 F | WEIGHT: 208 LBS | HEIGHT: 62 IN | HEART RATE: 68 BPM | BODY MASS INDEX: 38.28 KG/M2 | SYSTOLIC BLOOD PRESSURE: 130 MMHG

## 2025-06-30 DIAGNOSIS — M48.062 SPINAL STENOSIS, LUMBAR REGION WITH NEUROGENIC CLAUDICATION: ICD-10-CM

## 2025-06-30 DIAGNOSIS — R32 URINARY INCONTINENCE, UNSPECIFIED TYPE: ICD-10-CM

## 2025-06-30 DIAGNOSIS — Z13.1 SCREENING FOR DIABETES MELLITUS: ICD-10-CM

## 2025-06-30 DIAGNOSIS — E55.9 VITAMIN D DEFICIENCY: ICD-10-CM

## 2025-06-30 DIAGNOSIS — I10 ESSENTIAL (PRIMARY) HYPERTENSION: ICD-10-CM

## 2025-06-30 DIAGNOSIS — Z00.00 ENCOUNTER FOR MEDICARE ANNUAL WELLNESS EXAM: ICD-10-CM

## 2025-06-30 DIAGNOSIS — Z12.31 ENCOUNTER FOR SCREENING MAMMOGRAM FOR MALIGNANT NEOPLASM OF BREAST: ICD-10-CM

## 2025-06-30 DIAGNOSIS — R73.9 HYPERGLYCEMIA: ICD-10-CM

## 2025-06-30 DIAGNOSIS — M17.0 PRIMARY OSTEOARTHRITIS OF BOTH KNEES: ICD-10-CM

## 2025-06-30 DIAGNOSIS — K21.9 GASTROESOPHAGEAL REFLUX DISEASE WITHOUT ESOPHAGITIS: ICD-10-CM

## 2025-06-30 DIAGNOSIS — Z78.0 ASYMPTOMATIC MENOPAUSE: ICD-10-CM

## 2025-06-30 DIAGNOSIS — E78.2 MIXED HYPERLIPIDEMIA: ICD-10-CM

## 2025-06-30 DIAGNOSIS — Z13.29 SCREENING FOR THYROID DISORDER: ICD-10-CM

## 2025-06-30 DIAGNOSIS — D64.9 ANEMIA, UNSPECIFIED TYPE: ICD-10-CM

## 2025-06-30 DIAGNOSIS — L30.9 DERMATITIS: Primary | ICD-10-CM

## 2025-06-30 DIAGNOSIS — N32.81 OVERACTIVE BLADDER: ICD-10-CM

## 2025-06-30 DIAGNOSIS — G25.81 RESTLESS LEG SYNDROME: ICD-10-CM

## 2025-06-30 DIAGNOSIS — Z00.00 ANNUAL PHYSICAL EXAM: ICD-10-CM

## 2025-06-30 PROCEDURE — 1123F ACP DISCUSS/DSCN MKR DOCD: CPT | Performed by: INTERNAL MEDICINE

## 2025-06-30 PROCEDURE — 1170F FXNL STATUS ASSESSED: CPT | Performed by: INTERNAL MEDICINE

## 2025-06-30 PROCEDURE — 1159F MED LIST DOCD IN RCRD: CPT | Performed by: INTERNAL MEDICINE

## 2025-06-30 PROCEDURE — G0444 DEPRESSION SCREEN ANNUAL: HCPCS | Performed by: INTERNAL MEDICINE

## 2025-06-30 PROCEDURE — 99214 OFFICE O/P EST MOD 30 MIN: CPT | Performed by: INTERNAL MEDICINE

## 2025-06-30 PROCEDURE — 3079F DIAST BP 80-89 MM HG: CPT | Performed by: INTERNAL MEDICINE

## 2025-06-30 PROCEDURE — 1158F ADVNC CARE PLAN TLK DOCD: CPT | Performed by: INTERNAL MEDICINE

## 2025-06-30 PROCEDURE — G0439 PPPS, SUBSEQ VISIT: HCPCS | Performed by: INTERNAL MEDICINE

## 2025-06-30 PROCEDURE — 3008F BODY MASS INDEX DOCD: CPT | Performed by: INTERNAL MEDICINE

## 2025-06-30 PROCEDURE — 3075F SYST BP GE 130 - 139MM HG: CPT | Performed by: INTERNAL MEDICINE

## 2025-06-30 RX ORDER — TRIAMCINOLONE ACETONIDE 5 MG/G
CREAM TOPICAL
Qty: 45 G | Refills: 1 | Status: SHIPPED | OUTPATIENT
Start: 2025-06-30

## 2025-06-30 ASSESSMENT — ACTIVITIES OF DAILY LIVING (ADL)
TOILETING: INDEPENDENT
FEEDING YOURSELF: INDEPENDENT
GROOMING: INDEPENDENT
JUDGMENT_ADEQUATE_SAFELY_COMPLETE_DAILY_ACTIVITIES: YES
ADEQUATE_TO_COMPLETE_ADL: YES
EATING: INDEPENDENT
PREPARING MEALS: INDEPENDENT
USING TELEPHONE: INDEPENDENT
DRESSING: INDEPENDENT
BATHING: INDEPENDENT
STIL DRIVING: YES
PATIENT'S MEMORY ADEQUATE TO SAFELY COMPLETE DAILY ACTIVITIES?: YES
NEEDS ASSISTANCE WITH FOOD: INDEPENDENT
TAKING_MEDICATION: INDEPENDENT
GROCERY_SHOPPING: INDEPENDENT
DOING_HOUSEWORK: INDEPENDENT
USING TRANSPORTATION: INDEPENDENT
MANAGING_FINANCES: INDEPENDENT

## 2025-06-30 ASSESSMENT — PATIENT HEALTH QUESTIONNAIRE - PHQ9
SUM OF ALL RESPONSES TO PHQ9 QUESTIONS 1 AND 2: 0
2. FEELING DOWN, DEPRESSED OR HOPELESS: NOT AT ALL
1. LITTLE INTEREST OR PLEASURE IN DOING THINGS: NOT AT ALL

## 2025-06-30 ASSESSMENT — ENCOUNTER SYMPTOMS
DEPRESSION: 0
OCCASIONAL FEELINGS OF UNSTEADINESS: 0
LOSS OF SENSATION IN FEET: 0

## 2025-06-30 NOTE — PROGRESS NOTES
Date of Visit: 06/30/2025    Chief Complaint:  Chief Complaint   Patient presents with    Medicare Annual Wellness Visit Subsequent    Follow-up     Discuss bladder issues and right hip and back pain- had a prp injection last week       Advance Care Planning ,  [place]    HPI:  HPI   Subjective:  Patient Urvashi Alonso is a 74 y.o. female  that presents for Medicare Wellness  Right hip pain s/p prp injections x2.  The first one helped.  She is having significant pain since having the 2nd injection.     Feels pressure in the vaginal area like bladder is falling/prolapsed.  She was told that she had this issue several yrs ago.  Now she can feel it. Would like a referral to Dr Shawn Jordan.    S/p right cataract sx.    Obesity--has gone back to [employer]  feels that it is helping.     ROS:  Review of Systems   Constitutional:  Negative for activity change, chills, fatigue, fever and unexpected weight change.   HENT:  Negative for congestion, dental problem, ear pain, sinus pressure, sinus pain, sore throat and trouble swallowing.    Eyes:  Negative for photophobia, discharge, redness and visual disturbance.   Respiratory:  Negative for cough, chest tightness, shortness of breath and wheezing.    Cardiovascular:  Negative for chest pain, palpitations and leg swelling.   Gastrointestinal:  Negative for abdominal pain, blood in stool, constipation, diarrhea, nausea and vomiting.   Endocrine: Negative for cold intolerance, heat intolerance, polydipsia, polyphagia and polyuria.   Genitourinary:  Negative for difficulty urinating, dysuria, flank pain, frequency and urgency.        Bladder prolapse   Musculoskeletal:  Positive for arthralgias and back pain. Negative for joint swelling and myalgias.   Skin:  Negative for color change and rash.   Allergic/Immunologic: Negative for environmental allergies, food allergies and immunocompromised state.   Neurological:  Negative for dizziness, weakness,  light-headedness, numbness and headaches.   Hematological:  Does not bruise/bleed easily.   Psychiatric/Behavioral:  Negative for dysphoric mood and sleep disturbance. The patient is not nervous/anxious.        Medications:  Current Outpatient Medications   Medication Instructions    albuterol 90 mcg/actuation inhaler 2 puffs, inhalation, Every 6 hours PRN    amLODIPine (NORVASC) 2.5 mg, oral, Daily    famotidine (PEPCID) 40 mg, oral, Daily    fluticasone (Flonase) 50 mcg/actuation nasal spray 1 spray, 2 times daily    meloxicam (MOBIC) 15 mg, oral, Daily    mirabegron (MYRBETRIQ) 50 mg, oral, Daily    rOPINIRole (REQUIP) 1 mg, oral, 3 times daily PRN    traMADol (ULTRAM) 50 mg, oral, Every 12 hours PRN    triamterene-hydrochlorothiazid (Dyazide) 37.5-25 mg capsule 1 capsule, oral, Daily    vitamin B comp and C no.3 (B Complex Plus Vitamin C) 15-10-50-5-300 mg capsule Take by mouth.       Allergies:  RX Allergies[1]    Medical History[2]     Health Maintenance   Topic Date Due    Medicare Annual Wellness Visit (AWV)  Never done    Bone Density Scan  Never done    Hepatitis C Screening  Never done    DTaP/Tdap/Td Vaccines (1 - Tdap) Never done    Pneumococcal Vaccine (1 of 1 - PCV) Never done    Zoster Vaccines (2 of 3) 11/12/2015    Colorectal Cancer Screening  01/19/2024    COVID-19 Vaccine (2 - 2024-25 season) 09/01/2024    Mammogram  08/15/2025    Influenza Vaccine (Season Ended) 09/01/2025    Diabetes: Hemoglobin A1C  10/01/2025    RSV High Risk: (Elderly (60+) or Pregnant Population) (1 - 1-dose 75+ series) 11/17/2025    Lipid Panel  03/01/2029    HPV Vaccines (No Doses Required) Completed    HIB Vaccines  Aged Out    Hepatitis B Vaccines  Aged Out    IPV Vaccines  Aged Out    Hepatitis A Vaccines  Aged Out    Meningococcal Vaccine  Aged Out    Rotavirus Vaccines  Aged Out        Immunization History   Administered Date(s) Administered    [place] SARS-CoV-2 Vaccination 04/07/2021    Zoster, live 09/17/2015     "    Surgical History[3]     Family History[4]     Social History[5]     Vitals:  /80   Pulse 68   Temp 36.6 °C (97.8 °F)   Ht 1.575 m (5' 2\")   Wt 94.3 kg (208 lb)   SpO2 98%   BMI 38.04 kg/m²   BP Readings from Last 3 Encounters:   06/30/25 130/80   06/05/25 138/70   04/30/25 136/87      Wt Readings from Last 3 Encounters:   06/30/25 94.3 kg (208 lb)   06/05/25 94 kg (207 lb 3.2 oz)   03/05/25 95.3 kg (210 lb 3.2 oz)       Physical Exam:  Physical Exam  Constitutional:       General: She is not in acute distress.     Appearance: Normal appearance. She is well-developed and well-groomed. She is obese.   HENT:      Head: Normocephalic and atraumatic.      Right Ear: Tympanic membrane and ear canal normal.      Left Ear: Tympanic membrane and ear canal normal.      Nose: Nose normal.      Mouth/Throat:      Mouth: Mucous membranes are moist.      Pharynx: Oropharynx is clear.     Eyes:      Conjunctiva/sclera: Conjunctivae normal.      Pupils: Pupils are equal, round, and reactive to light.     Neck:      Thyroid: No thyromegaly.     Cardiovascular:      Rate and Rhythm: Normal rate and regular rhythm.      Heart sounds: Normal heart sounds, S1 normal and S2 normal. No murmur heard.  Pulmonary:      Effort: Pulmonary effort is normal.      Breath sounds: Normal breath sounds and air entry. No wheezing, rhonchi or rales.   Abdominal:      General: Bowel sounds are normal. There is no distension.      Palpations: Abdomen is soft.      Tenderness: There is no abdominal tenderness. There is no guarding or rebound.     Musculoskeletal:         General: No swelling or tenderness. Normal range of motion.      Cervical back: Normal, full passive range of motion without pain, normal range of motion and neck supple.      Thoracic back: Normal.      Lumbar back: Normal.      Right lower leg: No edema.      Left lower leg: No edema.      Comments: Upper and lower extrems are wnl--inspection and palpation.   Strength " is normal and symmetric.  Pain over the lateral right hip.    Lymphadenopathy:      Cervical: No cervical adenopathy.     Skin:     General: Skin is warm and dry.      Findings: No bruising, erythema, lesion or rash.      Comments: Intact     Neurological:      General: No focal deficit present.      Mental Status: She is alert and oriented to person, place, and time.      Sensory: Sensation is intact.      Motor: Motor function is intact.      Deep Tendon Reflexes: Reflexes are normal and symmetric.     Psychiatric:         Mood and Affect: Mood and affect normal.         Speech: Speech normal.         Behavior: Behavior normal. Behavior is cooperative.         Assessment/Plan:  Diagnoses and all orders for this visit:  Dermatitis  -     triamcinolone (Kenalog) 0.5 % cream; Apply a thin layer to affected area(s) twice daily prn  Encounter for Medicare annual wellness exam  -     CBC and Auto Differential; Future  -     Comprehensive Metabolic Panel; Future  -     Lipid Panel; Future  -     Urinalysis with Reflex Microscopic; Future  -     Vitamin D 25-Hydroxy,Total (for eval of Vitamin D levels); Future  -     TSH with reflex to Free T4 if abnormal; Future  -     Hemoglobin A1C; Future  Anemia, unspecified type  -     CBC and Auto Differential; Future  Annual physical exam  -     CBC and Auto Differential; Future  -     Urinalysis with Reflex Microscopic; Future  -     Vitamin D 25-Hydroxy,Total (for eval of Vitamin D levels); Future  -     TSH with reflex to Free T4 if abnormal; Future  -     Hemoglobin A1C; Future  Mixed hyperlipidemia  -     Lipid Panel; Future  Vitamin D deficiency  -     Vitamin D 25-Hydroxy,Total (for eval of Vitamin D levels); Future  Screening for thyroid disorder  -     TSH with reflex to Free T4 if abnormal; Future  Screening for diabetes mellitus  -     Hemoglobin A1C; Future  Hyperglycemia  -     Hemoglobin A1C; Future  Encounter for screening mammogram for malignant neoplasm of  breast  -     BI mammo bilateral screening tomosynthesis; Future  Urinary incontinence, unspecified type  -     Referral to Urogynecology; Future  Overactive bladder  -     Referral to Urogynecology; Future      Orders:  No orders of the defined types were placed in this encounter.      Future Appointments:  No future appointments.      Andrew Valle MD         [1] No Known Allergies  [2]   Past Medical History:  Diagnosis Date    BPPV (benign paroxysmal positional vertigo), unspecified laterality 10/28/2023    Dizziness 11/09/2024    Gouty arthritis of toe 09/25/2019    Lower respiratory tract infection 11/09/2024    Personal history of other diseases of the circulatory system     H/O: HTN (hypertension)   [3]   Past Surgical History:  Procedure Laterality Date    CHOLECYSTECTOMY  06/08/2017    Cholecystectomy    HIP SURGERY  06/08/2017    Hip Surgery    HYSTERECTOMY  06/08/2017    Hysterectomy    TOTAL KNEE ARTHROPLASTY  06/08/2017    Knee Replacement    TUBAL LIGATION  06/08/2017    Tubal Ligation   [4]   Family History  Problem Relation Name Age of Onset    Other (HEART PROBLEM) Mother      Cancer Father     [5]   Social History  Socioeconomic History    Marital status:    Tobacco Use    Smoking status: Never     Passive exposure: Never    Smokeless tobacco: Never   Vaping Use    Vaping status: Never Used   Substance and Sexual Activity    Alcohol use: Not Currently    Drug use: Never     Social Drivers of Health     Financial Resource Strain: Low Risk  (11/11/2023)    Received from [facility]    Overall Financial Resource Strain (CARDIA)     Difficulty of Paying Living Expenses: Not hard at all   Food Insecurity: Unknown (11/11/2023)    Received from [facility]    Hunger Vital Sign     Worried About Running Out of Food in the Last Year: Never true   Transportation Needs: No Transportation Needs (11/11/2023)    Received from [facility]    PRAPARE - Transportation     Lack of Transportation  (Medical): No     Lack of Transportation (Non-Medical): No   Physical Activity: Insufficiently Active (11/11/2023)    Received from [facility]    Exercise Vital Sign     Days of Exercise per Week: 1 day     Minutes of Exercise per Session: 60 min   Stress: No Stress Concern Present (11/11/2023)    Received from [facility]    [employer] [place] [employer] - Occupational Stress Questionnaire     Feeling of Stress : Not at all   Social Connections: Unknown (11/11/2023)    Received from [facility]    Social Connection and Isolation Panel [NHANES]     Frequency of Communication with Friends and Family: More than three times a week     Frequency of Social Gatherings with Friends and Family: More than three times a week     Attends Jew Services: More than 4 times per year     Active Member of Clubs or Organizations: Yes   Intimate Partner Violence: Not At Risk (11/11/2023)    Received from [facility]    Humiliation, Afraid, Rape, and Kick questionnaire     Fear of Current or Ex-Partner: No     Emotionally Abused: No     Physically Abused: No     Sexually Abused: No   Housing Stability: Low Risk  (11/11/2023)    Received from [facility]    Housing Stability Vital Sign     Unable to Pay for Housing in the Last Year: No     Number of Places Lived in the Last Year: 1     Unstable Housing in the Last Year: No

## 2025-07-27 PROBLEM — R32 URINARY INCONTINENCE: Status: ACTIVE | Noted: 2025-07-27

## 2025-07-27 PROBLEM — Z00.00 ENCOUNTER FOR MEDICARE ANNUAL WELLNESS EXAM: Status: ACTIVE | Noted: 2025-07-27

## 2025-07-27 PROBLEM — Z12.31 ENCOUNTER FOR SCREENING MAMMOGRAM FOR MALIGNANT NEOPLASM OF BREAST: Status: ACTIVE | Noted: 2025-07-27

## 2025-07-27 PROBLEM — Z00.00 ANNUAL PHYSICAL EXAM: Status: ACTIVE | Noted: 2025-07-27

## 2025-07-27 RX ORDER — MELOXICAM 15 MG/1
15 TABLET ORAL DAILY
Qty: 90 TABLET | Refills: 1 | Status: SHIPPED | OUTPATIENT
Start: 2025-07-27

## 2025-07-27 RX ORDER — AMLODIPINE BESYLATE 2.5 MG/1
2.5 TABLET ORAL DAILY
Qty: 90 TABLET | Refills: 1 | Status: SHIPPED | OUTPATIENT
Start: 2025-07-27

## 2025-07-27 RX ORDER — TRIAMTERENE AND HYDROCHLOROTHIAZIDE 37.5; 25 MG/1; MG/1
1 CAPSULE ORAL DAILY
Qty: 90 CAPSULE | Refills: 1 | Status: SHIPPED | OUTPATIENT
Start: 2025-07-27

## 2025-07-27 RX ORDER — ROPINIROLE 1 MG/1
1 TABLET, FILM COATED ORAL 3 TIMES DAILY PRN
Qty: 300 TABLET | Refills: 1 | Status: SHIPPED | OUTPATIENT
Start: 2025-07-27

## 2025-07-27 RX ORDER — FAMOTIDINE 40 MG/1
40 TABLET, FILM COATED ORAL DAILY
Qty: 90 TABLET | Refills: 1 | Status: SHIPPED | OUTPATIENT
Start: 2025-07-27

## 2025-07-27 ASSESSMENT — ENCOUNTER SYMPTOMS
NERVOUS/ANXIOUS: 0
FLANK PAIN: 0
MYALGIAS: 0
PALPITATIONS: 0
LIGHT-HEADEDNESS: 0
FREQUENCY: 0
ACTIVITY CHANGE: 0
SORE THROAT: 0
FEVER: 0
FATIGUE: 0
NUMBNESS: 0
NAUSEA: 0
ABDOMINAL PAIN: 0
VOMITING: 0
DYSURIA: 0
PHOTOPHOBIA: 0
POLYDIPSIA: 0
SLEEP DISTURBANCE: 0
POLYPHAGIA: 0
DIARRHEA: 0
BACK PAIN: 1
DIFFICULTY URINATING: 0
WEAKNESS: 0
BRUISES/BLEEDS EASILY: 0
SHORTNESS OF BREATH: 0
EYE REDNESS: 0
EYE DISCHARGE: 0
CHILLS: 0
SINUS PRESSURE: 0
DIZZINESS: 0
ARTHRALGIAS: 1
BLOOD IN STOOL: 0
UNEXPECTED WEIGHT CHANGE: 0
HEADACHES: 0
COUGH: 0
WHEEZING: 0
CONSTIPATION: 0
SINUS PAIN: 0
JOINT SWELLING: 0
DYSPHORIC MOOD: 0
COLOR CHANGE: 0
TROUBLE SWALLOWING: 0
CHEST TIGHTNESS: 0

## 2025-08-06 DIAGNOSIS — Z12.11 SCREENING FOR COLORECTAL CANCER: ICD-10-CM

## 2025-08-06 DIAGNOSIS — Z12.12 SCREENING FOR COLORECTAL CANCER: ICD-10-CM

## 2025-08-19 LAB
25(OH)D3+25(OH)D2 SERPL-MCNC: 52 NG/ML (ref 30–100)
ALBUMIN SERPL-MCNC: 4.3 G/DL (ref 3.6–5.1)
ALP SERPL-CCNC: 47 U/L (ref 37–153)
ALT SERPL-CCNC: 19 U/L (ref 6–29)
ANION GAP SERPL CALCULATED.4IONS-SCNC: 7 MMOL/L (CALC) (ref 7–17)
APPEARANCE UR: CLEAR
AST SERPL-CCNC: 21 U/L (ref 10–35)
BACTERIA #/AREA URNS HPF: ABNORMAL /HPF
BASOPHILS # BLD AUTO: 42 CELLS/UL (ref 0–200)
BASOPHILS NFR BLD AUTO: 0.6 %
BILIRUB SERPL-MCNC: 0.7 MG/DL (ref 0.2–1.2)
BILIRUB UR QL STRIP: NEGATIVE
BUN SERPL-MCNC: 20 MG/DL (ref 7–25)
CALCIUM SERPL-MCNC: 9.5 MG/DL (ref 8.6–10.4)
CHLORIDE SERPL-SCNC: 105 MMOL/L (ref 98–110)
CHOLEST SERPL-MCNC: 188 MG/DL
CHOLEST/HDLC SERPL: 2.6 (CALC)
CO2 SERPL-SCNC: 29 MMOL/L (ref 20–32)
COLOR UR: YELLOW
CREAT SERPL-MCNC: 0.88 MG/DL (ref 0.6–1)
EGFRCR SERPLBLD CKD-EPI 2021: 69 ML/MIN/1.73M2
EOSINOPHIL # BLD AUTO: 301 CELLS/UL (ref 15–500)
EOSINOPHIL NFR BLD AUTO: 4.3 %
ERYTHROCYTE [DISTWIDTH] IN BLOOD BY AUTOMATED COUNT: 13.9 % (ref 11–15)
EST. AVERAGE GLUCOSE BLD GHB EST-MCNC: 117 MG/DL
EST. AVERAGE GLUCOSE BLD GHB EST-SCNC: 6.5 MMOL/L
GLUCOSE SERPL-MCNC: 90 MG/DL (ref 65–99)
GLUCOSE UR QL STRIP: NEGATIVE
HBA1C MFR BLD: 5.7 %
HCT VFR BLD AUTO: 43.5 % (ref 35–45)
HDLC SERPL-MCNC: 72 MG/DL
HGB BLD-MCNC: 13.9 G/DL (ref 11.7–15.5)
HGB UR QL STRIP: NEGATIVE
HYALINE CASTS #/AREA URNS LPF: ABNORMAL /LPF
KETONES UR QL STRIP: NEGATIVE
LDLC SERPL CALC-MCNC: 98 MG/DL (CALC)
LEUKOCYTE ESTERASE UR QL STRIP: ABNORMAL
LYMPHOCYTES # BLD AUTO: 1407 CELLS/UL (ref 850–3900)
LYMPHOCYTES NFR BLD AUTO: 20.1 %
MCH RBC QN AUTO: 31.6 PG (ref 27–33)
MCHC RBC AUTO-ENTMCNC: 32 G/DL (ref 32–36)
MCV RBC AUTO: 98.9 FL (ref 80–100)
MONOCYTES # BLD AUTO: 700 CELLS/UL (ref 200–950)
MONOCYTES NFR BLD AUTO: 10 %
NEUTROPHILS # BLD AUTO: 4550 CELLS/UL (ref 1500–7800)
NEUTROPHILS NFR BLD AUTO: 65 %
NITRITE UR QL STRIP: NEGATIVE
NONHDLC SERPL-MCNC: 116 MG/DL (CALC)
PH UR STRIP: 8.5 [PH] (ref 5–8)
PLATELET # BLD AUTO: 251 THOUSAND/UL (ref 140–400)
PMV BLD REES-ECKER: 11 FL (ref 7.5–12.5)
POTASSIUM SERPL-SCNC: 4.3 MMOL/L (ref 3.5–5.3)
PROT SERPL-MCNC: 6.6 G/DL (ref 6.1–8.1)
PROT UR QL STRIP: NEGATIVE
RBC # BLD AUTO: 4.4 MILLION/UL (ref 3.8–5.1)
RBC #/AREA URNS HPF: ABNORMAL /HPF
SERVICE CMNT-IMP: ABNORMAL
SODIUM SERPL-SCNC: 141 MMOL/L (ref 135–146)
SP GR UR STRIP: 1.01 (ref 1–1.03)
SQUAMOUS #/AREA URNS HPF: ABNORMAL /HPF
TRIGL SERPL-MCNC: 87 MG/DL
TSH SERPL-ACNC: 1.44 MIU/L (ref 0.4–4.5)
WBC # BLD AUTO: 7 THOUSAND/UL (ref 3.8–10.8)
WBC #/AREA URNS HPF: ABNORMAL /HPF

## 2025-08-25 LAB — NONINV COLON CA DNA+OCC BLD SCRN STL QL: NEGATIVE
